# Patient Record
Sex: FEMALE | Race: WHITE | Employment: OTHER | URBAN - METROPOLITAN AREA
[De-identification: names, ages, dates, MRNs, and addresses within clinical notes are randomized per-mention and may not be internally consistent; named-entity substitution may affect disease eponyms.]

---

## 2018-04-12 ENCOUNTER — HOSPITAL ENCOUNTER (EMERGENCY)
Age: 73
Discharge: HOME | End: 2018-04-12
Payer: MEDICARE

## 2018-04-12 ENCOUNTER — APPOINTMENT (OUTPATIENT)
Dept: GENERAL RADIOLOGY | Age: 73
End: 2018-04-12
Payer: MEDICARE

## 2018-04-12 VITALS
BODY MASS INDEX: 30.18 KG/M2 | OXYGEN SATURATION: 96 % | SYSTOLIC BLOOD PRESSURE: 155 MMHG | HEART RATE: 71 BPM | TEMPERATURE: 97.8 F | WEIGHT: 165 LBS | DIASTOLIC BLOOD PRESSURE: 78 MMHG | RESPIRATION RATE: 16 BRPM

## 2018-04-12 DIAGNOSIS — L03.115 CELLULITIS OF RIGHT LOWER EXTREMITY: Primary | ICD-10-CM

## 2018-04-12 PROCEDURE — 99212 OFFICE O/P EST SF 10 MIN: CPT

## 2018-04-12 PROCEDURE — 73590 X-RAY EXAM OF LOWER LEG: CPT

## 2018-04-12 RX ORDER — LISINOPRIL 10 MG/1
10 TABLET ORAL DAILY
COMMUNITY
End: 2022-02-10

## 2018-04-12 RX ORDER — CEPHALEXIN 500 MG/1
500 CAPSULE ORAL 3 TIMES DAILY
Qty: 21 CAPSULE | Refills: 0 | Status: SHIPPED | OUTPATIENT
Start: 2018-04-12 | End: 2018-04-19

## 2018-04-12 RX ORDER — HYDROCHLOROTHIAZIDE 12.5 MG/1
12.5 TABLET ORAL DAILY
COMMUNITY
End: 2022-02-13 | Stop reason: SDUPTHER

## 2018-04-12 ASSESSMENT — PAIN DESCRIPTION - LOCATION: LOCATION: LEG

## 2018-04-12 ASSESSMENT — PAIN DESCRIPTION - ORIENTATION: ORIENTATION: RIGHT;LOWER

## 2018-04-12 ASSESSMENT — PAIN SCALES - GENERAL: PAINLEVEL_OUTOF10: 7

## 2018-10-29 ENCOUNTER — TELEPHONE (OUTPATIENT)
Dept: PULMONOLOGY | Age: 73
End: 2018-10-29

## 2021-03-01 ENCOUNTER — IMMUNIZATION (OUTPATIENT)
Dept: PRIMARY CARE CLINIC | Age: 76
End: 2021-03-01
Payer: MEDICARE

## 2021-03-01 PROCEDURE — 0011A COVID-19, MODERNA VACCINE 100MCG/0.5ML DOSE: CPT | Performed by: NURSE PRACTITIONER

## 2021-03-01 PROCEDURE — 91301 COVID-19, MODERNA VACCINE 100MCG/0.5ML DOSE: CPT | Performed by: NURSE PRACTITIONER

## 2021-03-29 ENCOUNTER — IMMUNIZATION (OUTPATIENT)
Dept: PRIMARY CARE CLINIC | Age: 76
End: 2021-03-29
Payer: MEDICARE

## 2021-03-29 PROCEDURE — 91301 COVID-19, MODERNA VACCINE 100MCG/0.5ML DOSE: CPT | Performed by: NURSE PRACTITIONER

## 2021-03-29 PROCEDURE — 0012A COVID-19, MODERNA VACCINE 100MCG/0.5ML DOSE: CPT | Performed by: NURSE PRACTITIONER

## 2021-07-14 ENCOUNTER — HOSPITAL ENCOUNTER (EMERGENCY)
Age: 76
Discharge: HOME | End: 2021-07-14
Payer: MEDICARE

## 2021-07-14 ENCOUNTER — APPOINTMENT (OUTPATIENT)
Dept: CT IMAGING | Age: 76
End: 2021-07-14
Payer: MEDICARE

## 2021-07-14 VITALS
BODY MASS INDEX: 31.65 KG/M2 | DIASTOLIC BLOOD PRESSURE: 80 MMHG | HEIGHT: 62 IN | OXYGEN SATURATION: 97 % | WEIGHT: 172 LBS | RESPIRATION RATE: 18 BRPM | HEART RATE: 73 BPM | SYSTOLIC BLOOD PRESSURE: 160 MMHG | TEMPERATURE: 98 F

## 2021-07-14 DIAGNOSIS — S00.83XA CONTUSION OF FACE, INITIAL ENCOUNTER: ICD-10-CM

## 2021-07-14 DIAGNOSIS — S00.83XA CONTUSION OF FOREHEAD, INITIAL ENCOUNTER: Primary | ICD-10-CM

## 2021-07-14 DIAGNOSIS — T07.XXXA ABRASIONS OF MULTIPLE SITES: ICD-10-CM

## 2021-07-14 PROCEDURE — 70450 CT HEAD/BRAIN W/O DYE: CPT

## 2021-07-14 PROCEDURE — 70486 CT MAXILLOFACIAL W/O DYE: CPT

## 2021-07-14 PROCEDURE — 99211 OFF/OP EST MAY X REQ PHY/QHP: CPT

## 2021-07-14 PROCEDURE — 72125 CT NECK SPINE W/O DYE: CPT

## 2021-07-14 ASSESSMENT — PAIN DESCRIPTION - ONSET: ONSET: ON-GOING

## 2021-07-14 ASSESSMENT — PAIN DESCRIPTION - PROGRESSION: CLINICAL_PROGRESSION: GRADUALLY WORSENING

## 2021-07-14 ASSESSMENT — PAIN DESCRIPTION - PAIN TYPE: TYPE: ACUTE PAIN

## 2021-07-14 ASSESSMENT — PAIN DESCRIPTION - FREQUENCY: FREQUENCY: CONTINUOUS

## 2021-07-14 ASSESSMENT — PAIN DESCRIPTION - DESCRIPTORS: DESCRIPTORS: SORE;ACHING

## 2021-07-14 ASSESSMENT — PAIN DESCRIPTION - ORIENTATION: ORIENTATION: LEFT;RIGHT

## 2021-07-14 ASSESSMENT — PAIN - FUNCTIONAL ASSESSMENT: PAIN_FUNCTIONAL_ASSESSMENT: ACTIVITIES ARE NOT PREVENTED

## 2021-07-14 NOTE — ED PROVIDER NOTES
[id number]  Department of Emergency Medicine  UC Encounter Note  21   4:25 PM EDT      NAME: Mikey Crigler  :  1945  MRN:  [id number]    Chief Complaint: Fall (Pt states \"I fell at the [facility] about 2 hrs ago . I went to put something in the Trash & fell over a barrier\" Pt's  drove her to the Urgent Care today)      This is a 79-year-old female the presents to urgent care stating that she fell today at a car wash. Patient states it happened so fast that she does not know exactly what happened but she was trying to put some trash away and fell over some type of barrier she states she hit her head also she has abrasions to her arm and leg as well. She states her glasses were knocked off in the lenses were scraped. She did state there was some nausea as well. She complains of pain still to her forehead and nose and she states the discomfort to the extremities are mostly because of the abrasions. She denies any chest pain or shortness of breath. No abdominal pain or back pain. No neck pain at this time. On first contact patient she appears to be in no acute distress she is here with her . Review of Systems  Pertinent positives and negatives are stated within HPI, all other systems reviewed and are negative. Physical Exam  Vitals and nursing note reviewed. Constitutional:       Appearance: She is well-developed. HENT:      Head: Normocephalic. Abrasion present. No raccoon eyes, Fuentes's sign, right periorbital erythema or left periorbital erythema. Jaw: There is normal jaw occlusion. Comments: Patient has an abrasion across the bridge of the nose also she has bruising that is light to her left forehead. There is tenderness to the left forehead. But no bony deformity. Right Ear: Hearing, tympanic membrane, ear canal and external ear normal. No mastoid tenderness. No hemotympanum.       Left Ear: Hearing, tympanic membrane, ear sensory deficit. Motor: Motor function is intact. Coordination: Coordination is intact. Coordination normal.      Gait: Gait is intact. Gait normal.         Procedures    MDM  Number of Diagnoses or Management Options  Abrasions of multiple sites  Contusion of face, initial encounter  Contusion of forehead, initial encounter  Diagnosis management comments: Patient worried about her head injury. She is in no acute distress though. We will do some CAT scans she will go to the hospital for the outpatient CAT scans her  will take her. Scans were reviewed. I spoke to her by phone. She is doing well. No new complaints. I will fax her over the discharge instructions. Told her to go to ER if symptoms worsen. --------------------------------------------- PAST HISTORY ---------------------------------------------  Past Medical History:  has a past medical history of Anxiety, Arthritis, and Hypertension. Past Surgical History:  has a past surgical history that includes Hysterectomy; Breast surgery; Tonsillectomy; and joint replacement. Social History:  reports that she has never smoked. She has never used smokeless tobacco. She reports that she does not drink alcohol and does not use drugs. Family History: family history is not on file. The patients home medications have been reviewed. Allergies: Codeine, Procaine hcl, Sulfa antibiotics, and Tetanus toxoids    -------------------------------------------------- RESULTS -------------------------------------------------  No results found for this visit on 07/14/21. CT FACIAL BONES WO CONTRAST    (Results Pending)   CT HEAD WO CONTRAST    (Results Pending)   CT CERVICAL SPINE WO CONTRAST    (Results Pending)       ------------------------- NURSING NOTES AND VITALS REVIEWED ---------------------------   The nursing notes within the ED encounter and vital signs as below have been reviewed.    BP (!) 160/80   Pulse 73   Temp 98 °F (36.7 °C) (Temporal)   Resp 18   Ht 5' 2\" (1.575 m)   Wt 172 lb (78 kg)   SpO2 97%   BMI 31.46 kg/m²   Oxygen Saturation Interpretation: Normal      ------------------------------------------ PROGRESS NOTES ------------------------------------------   I have spoken with the patient and discussed todays results, in addition to providing specific details for the plan of care and counseling regarding the diagnosis and prognosis. Their questions are answered at this time and they are agreeable with the plan.      --------------------------------- ADDITIONAL PROVIDER NOTES ---------------------------------     This patient is stable for discharge. I have shared the specific conditions for return, as well as the importance of follow-up. * NOTE: This report was transcribed using voice recognition software. Every effort was made to ensure accuracy; however, inadvertent computerized transcription errors may be present.    --------------------------------- IMPRESSION AND DISPOSITION ---------------------------------    IMPRESSION  1. Contusion of forehead, initial encounter    2. Contusion of face, initial encounter    3.  Abrasions of multiple sites        DISPOSITION  Disposition: Discharge to home  Patient condition is good           Suyapa España PA-C  07/14/21 [id number]

## 2021-10-29 ENCOUNTER — IMMUNIZATION (OUTPATIENT)
Dept: PRIMARY CARE CLINIC | Age: 76
End: 2021-10-29
Payer: MEDICARE

## 2021-10-29 PROCEDURE — 0064A COVID-19, MODERNA BOOSTER VACCINE 0.25ML DOSE: CPT | Performed by: NURSE PRACTITIONER

## 2021-10-29 PROCEDURE — 91306 COVID-19, MODERNA BOOSTER VACCINE 0.25ML DOSE: CPT | Performed by: NURSE PRACTITIONER

## 2022-02-10 ENCOUNTER — OFFICE VISIT (OUTPATIENT)
Dept: FAMILY MEDICINE CLINIC | Age: 77
End: 2022-02-10
Payer: MEDICARE

## 2022-02-10 VITALS
HEIGHT: 62 IN | DIASTOLIC BLOOD PRESSURE: 78 MMHG | WEIGHT: 176 LBS | SYSTOLIC BLOOD PRESSURE: 124 MMHG | OXYGEN SATURATION: 98 % | HEART RATE: 84 BPM | RESPIRATION RATE: 18 BRPM | BODY MASS INDEX: 32.39 KG/M2

## 2022-02-10 DIAGNOSIS — R00.0 TACHYCARDIA: ICD-10-CM

## 2022-02-10 DIAGNOSIS — L65.9 HAIR LOSS: ICD-10-CM

## 2022-02-10 DIAGNOSIS — F41.0 PANIC ANXIETY SYNDROME: ICD-10-CM

## 2022-02-10 DIAGNOSIS — J34.89 NOSE IRRITATION: ICD-10-CM

## 2022-02-10 DIAGNOSIS — I10 PRIMARY HYPERTENSION: Primary | ICD-10-CM

## 2022-02-10 DIAGNOSIS — R73.01 IFG (IMPAIRED FASTING GLUCOSE): ICD-10-CM

## 2022-02-10 DIAGNOSIS — E55.9 VITAMIN D DEFICIENCY: ICD-10-CM

## 2022-02-10 DIAGNOSIS — R53.82 CHRONIC FATIGUE: ICD-10-CM

## 2022-02-10 DIAGNOSIS — R00.2 PALPITATIONS: ICD-10-CM

## 2022-02-10 DIAGNOSIS — E78.2 MIXED HYPERLIPIDEMIA: ICD-10-CM

## 2022-02-10 DIAGNOSIS — M25.541 ARTHRALGIA OF BOTH HANDS: ICD-10-CM

## 2022-02-10 DIAGNOSIS — M25.542 ARTHRALGIA OF BOTH HANDS: ICD-10-CM

## 2022-02-10 DIAGNOSIS — U09.9 POST COVID-19 CONDITION, UNSPECIFIED: ICD-10-CM

## 2022-02-10 PROCEDURE — 99203 OFFICE O/P NEW LOW 30 MIN: CPT | Performed by: FAMILY MEDICINE

## 2022-02-10 RX ORDER — KETOCONAZOLE 20 MG/G
CREAM TOPICAL
COMMUNITY

## 2022-02-10 RX ORDER — SENNOSIDES 8.6 MG
CAPSULE ORAL
COMMUNITY

## 2022-02-10 RX ORDER — LOSARTAN POTASSIUM 50 MG/1
TABLET ORAL
COMMUNITY
Start: 2021-11-17 | End: 2022-02-13 | Stop reason: SDUPTHER

## 2022-02-10 RX ORDER — MUPIROCIN CALCIUM 20 MG/G
CREAM TOPICAL
Qty: 30 G | Refills: 5 | Status: SHIPPED | OUTPATIENT
Start: 2022-02-10

## 2022-02-10 SDOH — ECONOMIC STABILITY: FOOD INSECURITY: WITHIN THE PAST 12 MONTHS, THE FOOD YOU BOUGHT JUST DIDN'T LAST AND YOU DIDN'T HAVE MONEY TO GET MORE.: NEVER TRUE

## 2022-02-10 SDOH — ECONOMIC STABILITY: FOOD INSECURITY: WITHIN THE PAST 12 MONTHS, YOU WORRIED THAT YOUR FOOD WOULD RUN OUT BEFORE YOU GOT MONEY TO BUY MORE.: NEVER TRUE

## 2022-02-10 ASSESSMENT — PATIENT HEALTH QUESTIONNAIRE - PHQ9
SUM OF ALL RESPONSES TO PHQ QUESTIONS 1-9: 0
SUM OF ALL RESPONSES TO PHQ QUESTIONS 1-9: 0
1. LITTLE INTEREST OR PLEASURE IN DOING THINGS: 0
2. FEELING DOWN, DEPRESSED OR HOPELESS: 0
SUM OF ALL RESPONSES TO PHQ QUESTIONS 1-9: 0
SUM OF ALL RESPONSES TO PHQ QUESTIONS 1-9: 0
SUM OF ALL RESPONSES TO PHQ9 QUESTIONS 1 & 2: 0

## 2022-02-10 ASSESSMENT — SOCIAL DETERMINANTS OF HEALTH (SDOH): HOW HARD IS IT FOR YOU TO PAY FOR THE VERY BASICS LIKE FOOD, HOUSING, MEDICAL CARE, AND HEATING?: NOT VERY HARD

## 2022-02-10 ASSESSMENT — LIFESTYLE VARIABLES: HOW OFTEN DO YOU HAVE A DRINK CONTAINING ALCOHOL: NEVER

## 2022-02-10 NOTE — PROGRESS NOTES
Lenin Kraft is a 67 y.o. female  . Subjective:      Has been having some nasal bleeding in morning. Very dry in house. Discussed nasal staph and humidification. Losing some hair since since having covid. Discussed biotin and minoxidil . We will perfrm blood work to see if thyroid is involved. Having episodes of heart racing at night. Complains of arthralgias to hands and knees. Had total ankle years ago. Had cologuard, this was negative per patient. We will need to obtain this for record. Had mammogram, we will obtain this as well. Taking lorazepam this has helped. Patient understands habit forming nature of medication and assumes all risks. Patient will not drink alcohol , work or drive while on medication. Review of Systems   Constitutional: Positive for fatigue. Negative for activity change, appetite change, chills, diaphoresis, fever and unexpected weight change. HENT: Positive for nosebleeds. Negative for congestion, dental problem, drooling, ear discharge, ear pain, facial swelling, hearing loss, mouth sores, postnasal drip, rhinorrhea, sinus pressure, sneezing, sore throat, tinnitus, trouble swallowing and voice change. Eyes: Negative for photophobia, pain, discharge, redness, itching and visual disturbance. Respiratory: Negative for apnea, cough, choking, chest tightness, shortness of breath, wheezing and stridor. Cardiovascular: Positive for palpitations. Negative for chest pain and leg swelling. Gastrointestinal: Negative for abdominal distention, abdominal pain, anal bleeding, blood in stool, constipation, diarrhea, nausea, rectal pain and vomiting. Endocrine: Negative for cold intolerance, heat intolerance, polydipsia, polyphagia and polyuria.    Genitourinary: Negative for decreased urine volume, difficulty urinating, dyspareunia, dysuria, enuresis, flank pain, frequency, genital sores, hematuria, menstrual problem, pelvic pain, urgency, vaginal bleeding, vaginal discharge and vaginal pain.   Musculoskeletal: Negative for arthralgias, back pain, gait problem, joint swelling, myalgias, neck pain and neck stiffness. Skin: Negative for color change, pallor, rash and wound. Allergic/Immunologic: Negative for environmental allergies, food allergies and immunocompromised state. Neurological: Negative for dizziness, tremors, seizures, syncope, facial asymmetry, speech difficulty, weakness, light-headedness, numbness and headaches. Hematological: Negative for adenopathy. Does not bruise/bleed easily. Psychiatric/Behavioral: Negative for agitation, behavioral problems, confusion, decreased concentration, dysphoric mood, hallucinations, self-injury, sleep disturbance and suicidal ideas. The patient is not nervous/anxious and is not hyperactive. Past Medical History:   Diagnosis Date    Anxiety     Arthritis     Hypertension        Social History     Socioeconomic History    Marital status:      Spouse name: Not on file    Number of children: Not on file    Years of education: Not on file    Highest education level: Not on file   Occupational History    Not on file   Tobacco Use    Smoking status: Never Smoker    Smokeless tobacco: Never Used   Vaping Use    Vaping Use: Never used   Substance and Sexual Activity    Alcohol use: No    Drug use: No    Sexual activity: Not Currently     Partners: Male   Other Topics Concern    Not on file   Social History Narrative    Not on file     Social Determinants of Health     Financial Resource Strain: Low Risk     Difficulty of Paying Living Expenses: Not very hard   Food Insecurity: No Food Insecurity    Worried About Running Out of Food in the Last Year: Never true    [address] in the Last Year: Never true   Transportation Needs:     Lack of Transportation (Medical): Not on file    Lack of Transportation (Non-Medical):  Not on file   Physical Activity:     Days of Exercise per Week: Not on file    Minutes of Exercise per Session: Not on file   Stress:     Feeling of Stress : Not on file   Social Connections:     Frequency of Communication with Friends and Family: Not on file    Frequency of Social Gatherings with Friends and Family: Not on file    Attends Alevism Services: Not on file    Active Member of Clubs or Organizations: Not on file    Attends Club or Organization Meetings: Not on file    Marital Status: Not on file   Intimate Partner Violence:     Fear of Current or Ex-Partner: Not on file    Emotionally Abused: Not on file    Physically Abused: Not on file    Sexually Abused: Not on file   Housing Stability:     Unable to Pay for Housing in the Last Year: Not on file    Number of [place] in the Last Year: Not on file    Unstable Housing in the Last Year: Not on file       No family history on file. Current Outpatient Medications on File Prior to Visit   Medication Sig Dispense Refill    ketoconazole (NIZORAL) 2 % cream ketoconazole 2 % topical cream   APPLY TO THE AFFECTED AREA(S) BY TOPICAL ROUTE ONCE DAILY      losartan (COZAAR) 50 MG tablet TAKE 1 TABLET BY MOUTH EVERY DAY      hydroCHLOROthiazide (HYDRODIURIL) 12.5 MG tablet Take 12.5 mg by mouth daily       Garlic 748 MG TABS Take by mouth      RED YEAST RICE by Does not apply route.  therapeutic multivitamin-minerals (THERAGRAN-M) tablet Take 1 tablet by mouth daily.  Coenzyme Q10 (CO Q 10) 10 MG CAPS Take  by mouth.  Vitamin D (CHOLECALCIFEROL) 1000 UNITS CAPS capsule Take 1,000 Units by mouth daily.  Probiotic Product (PROBIOTIC FORMULA PO) Take  by mouth.  LORazepam (ATIVAN) 0.5 MG tablet Take 0.5 mg by mouth every 8 hours as needed.  metoprolol succinate (TOPROL XL) 50 MG extended release tablet Take 50 mg by mouth daily       loratadine (CLARITIN) 10 MG tablet Take 10 mg by mouth daily.  Naproxen Sodium (ALEVE) 220 MG CAPS Take  by mouth.       acetaminophen (TYLENOL 8 HOUR) 650 MG extended release tablet Tylenol Arthritis Pain 650 mg tablet,extended release   Take 1 tablet every 8 hours by oral route. No current facility-administered medications on file prior to visit. Allergies   Allergen Reactions    Clarithromycin Nausea Only    Codeine     Procaine Hcl     Sulfa Antibiotics     Tetanus Toxoids     Tetanus-Diphtheria Toxoids Td      Other reaction(s): Edema       I have reviewedher allergies, medications, problem list, medical, social and family history and have updated as needed in the electronic medical record. Objective:     Physical Exam  Vitals and nursing note reviewed. Constitutional:       General: She is not in acute distress. Appearance: She is well-developed. She is not diaphoretic. HENT:      Head: Normocephalic and atraumatic. Right Ear: External ear normal.      Left Ear: External ear normal.      Nose: Nose normal.      Comments: Nasal mucosa irritation     Mouth/Throat:      Pharynx: No oropharyngeal exudate. Eyes:      General: No scleral icterus. Right eye: No discharge. Left eye: No discharge. Conjunctiva/sclera: Conjunctivae normal.      Pupils: Pupils are equal, round, and reactive to light. Neck:      Thyroid: No thyromegaly. Vascular: No JVD. Trachea: No tracheal deviation. Cardiovascular:      Rate and Rhythm: Normal rate and regular rhythm. Heart sounds: Normal heart sounds. No murmur heard. No friction rub. No gallop. Pulmonary:      Effort: Pulmonary effort is normal. No respiratory distress. Breath sounds: Normal breath sounds. No stridor. No wheezing or rales. Chest:      Chest wall: No tenderness. Abdominal:      General: Bowel sounds are normal. There is no distension. Palpations: Abdomen is soft. There is no mass. Tenderness: There is no abdominal tenderness. There is no guarding or rebound. Genitourinary:     Comments:  Will follow with own gynecologist for gynecological and breast care. Musculoskeletal:         General: No tenderness. Normal range of motion. Cervical back: Normal range of motion and neck supple. Lymphadenopathy:      Cervical: No cervical adenopathy. Skin:     General: Skin is warm and dry. Coloration: Skin is not pale. Findings: No erythema or rash. Neurological:      Mental Status: She is alert and oriented to person, place, and time. Cranial Nerves: No cranial nerve deficit. Motor: No abnormal muscle tone. Coordination: Coordination normal.      Deep Tendon Reflexes: Reflexes are normal and symmetric. Reflexes normal.   Psychiatric:         Behavior: Behavior normal.         Thought Content: Thought content normal.         Judgment: Judgment normal.         Assessment / Plan:   Avel was seen today for established new doctor. Diagnoses and all orders for this visit:    Primary hypertension  -     HEPATITIS C ANTIBODY; Future  -     losartan (COZAAR) 50 MG tablet; TAKE 1 TABLET BY MOUTH EVERY DAY  -     hydroCHLOROthiazide (HYDRODIURIL) 12.5 MG tablet; Take 1 tablet by mouth daily  -     metoprolol succinate (TOPROL XL) 50 MG extended release tablet; Take 1 tablet by mouth daily    IFG (impaired fasting glucose)  -     Hemoglobin A1C; Future  -     HEPATITIS C ANTIBODY; Future    Tachycardia  -     HEPATITIS C ANTIBODY; Future  -     [facility]    Mixed hyperlipidemia  -     Lipid Panel; Future  -     HEPATITIS C ANTIBODY; Future    Vitamin D deficiency  -     Vitamin D 25 Hydroxy; Future  -     HEPATITIS C ANTIBODY; Future    Chronic fatigue  -     CBC Auto Differential; Future  -     Comprehensive Metabolic Panel; Future  -     TSH without Reflex; Future  -     T4, Free; Future  -     Vitamin B12 & Folate; Future  -     HEPATITIS C ANTIBODY; Future    Arthralgia of both hands  -     Rheumatoid Factor; Future  -     HEPATITIS C ANTIBODY;  Future    Nose irritation  -     mupirocin (BACTROBAN) 2 % cream; Apply at bedtime to bilateral nostrils as directed. -     HEPATITIS C ANTIBODY; Future    Post covid-19 condition, unspecified  -     HEPATITIS C ANTIBODY; Future    Hair loss  -     TSH without Reflex; Future  -     T4, Free; Future  -     HEPATITIS C ANTIBODY; Future    Panic anxiety syndrome  -     LORazepam (ATIVAN) 0.5 MG tablet; Take 1 tablet by mouth every 8 hours as needed for Anxiety. Palpitations  -     [address] with own gynecologist for gynecological and breast care. Reviewed health maintenance report. Patient is aware of deficiencies and suggested preventative tests.

## 2022-02-11 DIAGNOSIS — R00.0 TACHYCARDIA: ICD-10-CM

## 2022-02-11 DIAGNOSIS — E55.9 VITAMIN D DEFICIENCY: ICD-10-CM

## 2022-02-11 DIAGNOSIS — M25.542 ARTHRALGIA OF BOTH HANDS: ICD-10-CM

## 2022-02-11 DIAGNOSIS — J34.89 NOSE IRRITATION: ICD-10-CM

## 2022-02-11 DIAGNOSIS — R73.01 IFG (IMPAIRED FASTING GLUCOSE): ICD-10-CM

## 2022-02-11 DIAGNOSIS — L65.9 HAIR LOSS: ICD-10-CM

## 2022-02-11 DIAGNOSIS — E78.2 MIXED HYPERLIPIDEMIA: ICD-10-CM

## 2022-02-11 DIAGNOSIS — I10 PRIMARY HYPERTENSION: ICD-10-CM

## 2022-02-11 DIAGNOSIS — M25.541 ARTHRALGIA OF BOTH HANDS: ICD-10-CM

## 2022-02-11 DIAGNOSIS — R53.82 CHRONIC FATIGUE: ICD-10-CM

## 2022-02-11 DIAGNOSIS — U09.9 POST COVID-19 CONDITION, UNSPECIFIED: ICD-10-CM

## 2022-02-11 LAB
ALBUMIN SERPL-MCNC: 4.5 G/DL (ref 3.5–5.2)
ALP BLD-CCNC: 65 U/L (ref 35–104)
ALT SERPL-CCNC: 20 U/L (ref 0–32)
ANION GAP SERPL CALCULATED.3IONS-SCNC: 16 MMOL/L (ref 7–16)
AST SERPL-CCNC: 27 U/L (ref 0–31)
BASOPHILS ABSOLUTE: 0.03 E9/L (ref 0–0.2)
BASOPHILS RELATIVE PERCENT: 0.5 % (ref 0–2)
BILIRUB SERPL-MCNC: 1.3 MG/DL (ref 0–1.2)
BUN BLDV-MCNC: 14 MG/DL (ref 6–23)
CALCIUM SERPL-MCNC: 9.6 MG/DL (ref 8.6–10.2)
CHLORIDE BLD-SCNC: 97 MMOL/L (ref 98–107)
CHOLESTEROL, TOTAL: 203 MG/DL (ref 0–199)
CO2: 21 MMOL/L (ref 22–29)
CREAT SERPL-MCNC: 0.7 MG/DL (ref 0.5–1)
EOSINOPHILS ABSOLUTE: 0.09 E9/L (ref 0.05–0.5)
EOSINOPHILS RELATIVE PERCENT: 1.4 % (ref 0–6)
FOLATE: >20 NG/ML (ref 4.8–24.2)
GFR AFRICAN AMERICAN: >60
GFR NON-AFRICAN AMERICAN: >60 ML/MIN/1.73
GLUCOSE BLD-MCNC: 111 MG/DL (ref 74–99)
HBA1C MFR BLD: 5.9 % (ref 4–5.6)
HCT VFR BLD CALC: 41.2 % (ref 34–48)
HDLC SERPL-MCNC: 52 MG/DL
HEMOGLOBIN: 14 G/DL (ref 11.5–15.5)
IMMATURE GRANULOCYTES #: 0.03 E9/L
IMMATURE GRANULOCYTES %: 0.5 % (ref 0–5)
LDL CHOLESTEROL CALCULATED: 125 MG/DL (ref 0–99)
LYMPHOCYTES ABSOLUTE: 1.66 E9/L (ref 1.5–4)
LYMPHOCYTES RELATIVE PERCENT: 25.9 % (ref 20–42)
MCH RBC QN AUTO: 31.7 PG (ref 26–35)
MCHC RBC AUTO-ENTMCNC: 34 % (ref 32–34.5)
MCV RBC AUTO: 93.2 FL (ref 80–99.9)
MONOCYTES ABSOLUTE: 0.74 E9/L (ref 0.1–0.95)
MONOCYTES RELATIVE PERCENT: 11.6 % (ref 2–12)
NEUTROPHILS ABSOLUTE: 3.85 E9/L (ref 1.8–7.3)
NEUTROPHILS RELATIVE PERCENT: 60.1 % (ref 43–80)
PDW BLD-RTO: 14.2 FL (ref 11.5–15)
PLATELET # BLD: 287 E9/L (ref 130–450)
PMV BLD AUTO: 9.3 FL (ref 7–12)
POTASSIUM SERPL-SCNC: 4.3 MMOL/L (ref 3.5–5)
RBC # BLD: 4.42 E12/L (ref 3.5–5.5)
RHEUMATOID FACTOR: <10 IU/ML (ref 0–13)
SODIUM BLD-SCNC: 134 MMOL/L (ref 132–146)
T4 FREE: 1.34 NG/DL (ref 0.93–1.7)
TOTAL PROTEIN: 7.7 G/DL (ref 6.4–8.3)
TRIGL SERPL-MCNC: 131 MG/DL (ref 0–149)
TSH SERPL DL<=0.05 MIU/L-ACNC: 2.64 UIU/ML (ref 0.27–4.2)
VITAMIN B-12: 895 PG/ML (ref 211–946)
VITAMIN D 25-HYDROXY: 30 NG/ML (ref 30–100)
VLDLC SERPL CALC-MCNC: 26 MG/DL
WBC # BLD: 6.4 E9/L (ref 4.5–11.5)

## 2022-02-13 RX ORDER — HYDROCHLOROTHIAZIDE 12.5 MG/1
12.5 TABLET ORAL DAILY
Qty: 90 TABLET | Refills: 5 | Status: SHIPPED | OUTPATIENT
Start: 2022-02-13

## 2022-02-13 RX ORDER — LORAZEPAM 0.5 MG/1
0.5 TABLET ORAL EVERY 8 HOURS PRN
Qty: 90 TABLET | Refills: 5 | Status: SHIPPED
Start: 2022-02-13 | End: 2022-04-13 | Stop reason: SDUPTHER

## 2022-02-13 RX ORDER — LOSARTAN POTASSIUM 50 MG/1
TABLET ORAL
Qty: 90 TABLET | Refills: 5 | Status: SHIPPED | OUTPATIENT
Start: 2022-02-13

## 2022-02-13 RX ORDER — METOPROLOL SUCCINATE 50 MG/1
50 TABLET, EXTENDED RELEASE ORAL DAILY
Qty: 90 TABLET | Refills: 5 | Status: SHIPPED | OUTPATIENT
Start: 2022-02-13

## 2022-02-13 ASSESSMENT — ENCOUNTER SYMPTOMS
BACK PAIN: 0
CHEST TIGHTNESS: 0
ABDOMINAL PAIN: 0
WHEEZING: 0
PHOTOPHOBIA: 0
NAUSEA: 0
RECTAL PAIN: 0
CHOKING: 0
STRIDOR: 0
RHINORRHEA: 0
EYE REDNESS: 0
FACIAL SWELLING: 0
ABDOMINAL DISTENTION: 0
TROUBLE SWALLOWING: 0
SORE THROAT: 0
SINUS PRESSURE: 0
SHORTNESS OF BREATH: 0
ANAL BLEEDING: 0
VOMITING: 0
DIARRHEA: 0
COUGH: 0
EYE ITCHING: 0
EYE PAIN: 0
EYE DISCHARGE: 0
APNEA: 0
VOICE CHANGE: 0
COLOR CHANGE: 0
CONSTIPATION: 0
BLOOD IN STOOL: 0

## 2022-02-16 LAB — HEPATITIS C ANTIBODY INTERPRETATION: NORMAL

## 2022-02-18 PROBLEM — R00.2 PALPITATIONS: Status: ACTIVE | Noted: 2022-02-18

## 2022-02-18 PROBLEM — E78.00 PURE HYPERCHOLESTEROLEMIA: Status: ACTIVE | Noted: 2022-02-18

## 2022-02-18 PROBLEM — E66.9 MODERATE OBESITY: Status: ACTIVE | Noted: 2022-02-18

## 2022-02-18 PROBLEM — E66.8 MODERATE OBESITY: Status: ACTIVE | Noted: 2022-02-18

## 2022-02-18 PROBLEM — I10 PRIMARY HYPERTENSION: Status: ACTIVE | Noted: 2022-02-18

## 2022-02-25 RX ORDER — AMOXICILLIN 875 MG/1
875 TABLET, COATED ORAL 2 TIMES DAILY
Qty: 20 TABLET | Refills: 0 | Status: SHIPPED | OUTPATIENT
Start: 2022-02-25 | End: 2022-03-07

## 2022-03-09 ENCOUNTER — OFFICE VISIT (OUTPATIENT)
Dept: CARDIOLOGY CLINIC | Age: 77
End: 2022-03-09
Payer: MEDICARE

## 2022-03-09 VITALS
SYSTOLIC BLOOD PRESSURE: 140 MMHG | BODY MASS INDEX: 32.2 KG/M2 | HEART RATE: 116 BPM | HEIGHT: 62 IN | DIASTOLIC BLOOD PRESSURE: 84 MMHG | WEIGHT: 175 LBS | RESPIRATION RATE: 16 BRPM

## 2022-03-09 DIAGNOSIS — E66.8 MODERATE OBESITY: ICD-10-CM

## 2022-03-09 DIAGNOSIS — I10 PRIMARY HYPERTENSION: ICD-10-CM

## 2022-03-09 DIAGNOSIS — E78.00 PURE HYPERCHOLESTEROLEMIA: ICD-10-CM

## 2022-03-09 DIAGNOSIS — I48.0 PAROXYSMAL ATRIAL FIBRILLATION (HCC): Primary | ICD-10-CM

## 2022-03-09 PROBLEM — U07.1 COVID-19 VIRUS INFECTION: Status: ACTIVE | Noted: 2022-03-09

## 2022-03-09 PROCEDURE — 99204 OFFICE O/P NEW MOD 45 MIN: CPT | Performed by: INTERNAL MEDICINE

## 2022-03-09 PROCEDURE — 93000 ELECTROCARDIOGRAM COMPLETE: CPT | Performed by: INTERNAL MEDICINE

## 2022-03-09 RX ORDER — MULTIVIT WITH MINERALS/LUTEIN
1000 TABLET ORAL DAILY
COMMUNITY
End: 2022-03-09 | Stop reason: ALTCHOICE

## 2022-03-09 RX ORDER — ASCORBIC ACID 500 MG
500 TABLET ORAL DAILY
COMMUNITY

## 2022-03-09 NOTE — PROGRESS NOTES
OUTPATIENT CARDIOLOGY CONSULT    Name: Perez Peacock    Age: 65 y.o. Date of Service: 3/9/2022    Reason for Consultation: Paroxysmal atrial fibrillation, hypertension, moderate obesity    Referring Physician: Fidel Acosta DO    History of Present Illness: The patient is a a70-year-old white female with no previous documented structural heart disease and a risk profile of hypertension, hyperlipidemia and moderate obesity. She relates a chronic exertional dyspnea (functional class II) with no additional symptoms of decompensated left ventricular systolic dysfunction or volume overload as well as denying arrhythmia related symptoms. Over the past 2 months she is noted an increase of heart rates without by her report any change of her chronic dyspnea or changes of her functional capabilities with no additional cardiovascular symptoms and was noted to be tachycardic at time of most recent assessment by her primary care physician without electrocardiogram obtained. At the time of evaluation beyond that of palpitation she denies any additional arrhythmia related symptoms and has noted no symptoms of a focal neurologic origin nor that of significant bleeding. Review of Systems: The remainder of a complete multisystem review including consitutional, central nervous, respiratory, circulatory, gastrointestinal, genitourinary, endocrinologic, hematologic, musculoskeletal and psychiatric are negative. Past Medical History:  Past Medical History:   Diagnosis Date    Anxiety     Arthritis     Hypertension     Pure hypercholesterolemia 2/18/2022       Past Surgical History:  Past Surgical History:   Procedure Laterality Date    BREAST SURGERY      HYSTERECTOMY      JOINT REPLACEMENT      right ankle    TONSILLECTOMY         Family History:  History reviewed. No pertinent family history.     Social History:  Social History     Socioeconomic History    Marital status:      Spouse name: Not on file  Number of children: Not on file    Years of education: Not on file    Highest education level: Not on file   Occupational History    Not on file   Tobacco Use    Smoking status: Never Smoker    Smokeless tobacco: Never Used   Vaping Use    Vaping Use: Never used   Substance and Sexual Activity    Alcohol use: No    Drug use: No    Sexual activity: Not Currently     Partners: Male   Other Topics Concern    Not on file   Social History Narrative    Not on file     Social Determinants of Health     Financial Resource Strain: Low Risk     Difficulty of Paying Living Expenses: Not very hard   Food Insecurity: No Food Insecurity    Worried About Running Out of Food in the Last Year: Never true    Mikel of Food in the Last Year: Never true   Transportation Needs:     Lack of Transportation (Medical): Not on file    Lack of Transportation (Non-Medical): Not on file   Physical Activity:     Days of Exercise per Week: Not on file    Minutes of Exercise per Session: Not on file   Stress:     Feeling of Stress : Not on file   Social Connections:     Frequency of Communication with Friends and Family: Not on file    Frequency of Social Gatherings with Friends and Family: Not on file    Attends Hinduism Services: Not on file    Active Member of [address] [place] [address] or Organizations: Not on file    Attends Club or Organization Meetings: Not on file    Marital Status: Not on file   Intimate Partner Violence:     Fear of Current or Ex-Partner: Not on file    Emotionally Abused: Not on file    Physically Abused: Not on file    Sexually Abused: Not on file   Housing Stability:     Unable to Pay for Housing in the Last Year: Not on file    Number of [place] in the Last Year: Not on file    Unstable Housing in the Last Year: Not on file       Allergies:   Allergies   Allergen Reactions    Clarithromycin Nausea Only    Codeine     Procaine Hcl     Sulfa Antibiotics     Tetanus Toxoids     Tetanus-Diphtheria Toxoids Td      Other reaction(s): Edema       Current Medications:  Current Outpatient Medications   Medication Sig Dispense Refill    vitamin C (ASCORBIC ACID) 500 MG tablet Take 500 mg by mouth daily      vitamin E 1000 units capsule Take 1,000 Units by mouth daily      losartan (COZAAR) 50 MG tablet TAKE 1 TABLET BY MOUTH EVERY DAY 90 tablet 5    hydroCHLOROthiazide (HYDRODIURIL) 12.5 MG tablet Take 1 tablet by mouth daily 90 tablet 5    LORazepam (ATIVAN) 0.5 MG tablet Take 1 tablet by mouth every 8 hours as needed for Anxiety. 90 tablet 5    metoprolol succinate (TOPROL XL) 50 MG extended release tablet Take 1 tablet by mouth daily 90 tablet 5    acetaminophen (TYLENOL 8 HOUR) 650 MG extended release tablet Tylenol Arthritis Pain 650 mg tablet,extended release   Take 1 tablet every 8 hours by oral route.  Garlic 925 MG TABS Take by mouth      RED YEAST RICE by Does not apply route.  therapeutic multivitamin-minerals (THERAGRAN-M) tablet Take 1 tablet by mouth daily.  Coenzyme Q10 (CO Q 10) 10 MG CAPS Take  by mouth.  Vitamin D (CHOLECALCIFEROL) 1000 UNITS CAPS capsule Take 1,000 Units by mouth daily.  Probiotic Product (PROBIOTIC FORMULA PO) Take  by mouth.  loratadine (CLARITIN) 10 MG tablet Take 10 mg by mouth daily.  ketoconazole (NIZORAL) 2 % cream ketoconazole 2 % topical cream   APPLY TO THE AFFECTED AREA(S) BY TOPICAL ROUTE ONCE DAILY (Patient not taking: Reported on 3/9/2022)      mupirocin (BACTROBAN) 2 % cream Apply at bedtime to bilateral nostrils as directed. (Patient not taking: Reported on 3/9/2022) 30 g 5    Naproxen Sodium (ALEVE) 220 MG CAPS Take  by mouth. (Patient not taking: Reported on 3/9/2022)       No current facility-administered medications for this visit.          Physical Exam:  BP (!) 140/84   Pulse 116   Resp 16   Ht 5' 2\" (1.575 m)   Wt 175 lb (79.4 kg)   BMI 32.01 kg/m²   Wt Readings from Last 3 Encounters: 03/09/22 175 lb (79.4 kg)   02/10/22 176 lb (79.8 kg)   07/14/21 172 lb (78 kg)     The patient is awake, alert and in no discomfort or distress. No gross musculoskeletal deformity or lymphadenopathy are present. No significant skin or nail changes are present. Gross examination of head, eyes, nose and throat are negative. Jugular venous pressure is normal and no carotid bruits are present. No thyromegaly is noted. Normal respiratory effort is noted with no accessory muscle usage present. Lung fields are clear to ascultation. Cardiac examination is notable for an irregular rhythm with no palpable thrill. No gallop rhythm or cardiac murmur are identified. A benign abdominal examination is present with the exception of mild obesity and no masses or organomegaly. A normal abdominal aortic pulsation is present. Intact pulses are present throughout all extremities and no peripheral edema is present. No focal neurologic deficits are present. Laboratory Tests:  Lab Results   Component Value Date    CREATININE 0.7 02/11/2022    BUN 14 02/11/2022     02/11/2022    K 4.3 02/11/2022    CL 97 (L) 02/11/2022    CO2 21 (L) 02/11/2022     No results found for: BNP  Lab Results   Component Value Date    WBC 6.4 02/11/2022    RBC 4.42 02/11/2022    HGB 14.0 02/11/2022    HCT 41.2 02/11/2022    MCV 93.2 02/11/2022    MCH 31.7 02/11/2022    MCHC 34.0 02/11/2022    RDW 14.2 02/11/2022     02/11/2022    MPV 9.3 02/11/2022     No results for input(s): ALKPHOS, ALT, AST, PROT, BILITOT, BILIDIR, LABALBU in the last 72 hours.   No results found for: MG  No results found for: PROTIME, INR  Lab Results   Component Value Date    TSH 2.640 02/11/2022     No components found for: CHLPL  Lab Results   Component Value Date    TRIG 131 02/11/2022    TRIG 123 11/08/2011    TRIG 148 08/08/2011     Lab Results   Component Value Date    HDL 52 02/11/2022    HDL 62.0 11/08/2011    HDL 56.0 08/08/2011     Lab Results   Component Value Date    LDLCALC 125 (H) 02/11/2022    LDLCALC 160 (H) 11/08/2011    LDLCALC 114 (H) 08/08/2011       Cardiac Tests:  ECG: A resting electrocardiogram demonstrates evidence of atrial fibrillation with a mean ventricular response of approximately 115 bpm with left axis deviation, a delayed precordial transition zone and nonspecific ST changes      ASSESSMENT / PLAN: On a clinical basis, the patient presents with evidence of atrial fibrillation with suboptimal rate control of an uncertain duration and at least by patient report minimal symptomatology. On this basis, I have discussed this extensively with her and have recommended optimization of her medical regimen inclusive of an increase of her beta-blocker dosage to provide further rate stabilization and reduce potential adverse risks of tachycardia in addition to that of initiation of oral anticoagulation to reduce risk of embolic events. An echocardiogram will be obtained to assess the presence or absence of additional structural abnormalities contributing to her atrial arrhythmias to guide additional management. Presently, I plan a short-term rate control and anticoagulation strategy until therapeutic anticoagulation has been established prior to the consideration of electrical cardioversion to both reduce risk of further adverse effects in the face of tachycardia as well as to assist in restoration of mechanical synchrony. She additionally will benefit from appropriate lifestyle modification to achieve weight reduction to benefit diastolic cardiac performance as well as to reduce risk of the development of obstructive sleep apnea with associated adverse cardiovascular effects. Ongoing aggressive risk factor modification of blood pressure and serum lipids remain essential to reducing risk of future atherosclerotic development.   I presently plan her clinical reassessment approximately 1 month and would happily reassess her in the interim should additional cardiovascular difficulties or concerns arise. Follow-up office visit in 1 month. Thank you for allowing me to participate in your patient's care. Please feel free to contact me if you have any questions or concerns. Note: This report was completed utilizing a computerized voice recognition software. Every effort has been made to insure accuracy, however; inadvertent computerized transcription errors may be present. Latia Irwin.  [facility], [address] [place] [address]    An electronic copy of this consult note was forwarded to Dr. Asif Bay

## 2022-04-13 DIAGNOSIS — F41.0 PANIC ANXIETY SYNDROME: ICD-10-CM

## 2022-04-13 RX ORDER — LORAZEPAM 0.5 MG/1
0.5 TABLET ORAL EVERY 8 HOURS PRN
Qty: 270 TABLET | Refills: 3 | Status: SHIPPED
Start: 2022-04-13 | End: 2022-07-18 | Stop reason: SDUPTHER

## 2022-05-08 ENCOUNTER — APPOINTMENT (OUTPATIENT)
Dept: CT IMAGING | Age: 77
End: 2022-05-08
Payer: OTHER MISCELLANEOUS

## 2022-05-08 ENCOUNTER — APPOINTMENT (OUTPATIENT)
Dept: GENERAL RADIOLOGY | Age: 77
End: 2022-05-08
Payer: OTHER MISCELLANEOUS

## 2022-05-08 ENCOUNTER — HOSPITAL ENCOUNTER (EMERGENCY)
Age: 77
Discharge: HOME | End: 2022-05-08
Attending: EMERGENCY MEDICINE
Payer: OTHER MISCELLANEOUS

## 2022-05-08 VITALS
DIASTOLIC BLOOD PRESSURE: 110 MMHG | HEART RATE: 98 BPM | SYSTOLIC BLOOD PRESSURE: 175 MMHG | RESPIRATION RATE: 14 BRPM | OXYGEN SATURATION: 98 % | TEMPERATURE: 97.6 F

## 2022-05-08 DIAGNOSIS — M79.645 BILATERAL THUMB PAIN: ICD-10-CM

## 2022-05-08 DIAGNOSIS — M54.2 NECK PAIN: ICD-10-CM

## 2022-05-08 DIAGNOSIS — M79.644 BILATERAL THUMB PAIN: ICD-10-CM

## 2022-05-08 DIAGNOSIS — Z79.01 ON ANTICOAGULANT THERAPY: ICD-10-CM
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
## 2022-05-08 DIAGNOSIS — I48.91 ATRIAL FIBRILLATION WITH RVR (HCC): ICD-10-CM

## 2022-05-08 LAB
ALBUMIN SERPL-MCNC: 4.3 G/DL (ref 3.5–5.2)
ALP BLD-CCNC: 80 U/L (ref 35–104)
ALT SERPL-CCNC: 28 U/L (ref 0–32)
ANION GAP SERPL CALCULATED.3IONS-SCNC: 14 MMOL/L (ref 7–16)
AST SERPL-CCNC: 40 U/L (ref 0–31)
BASOPHILS ABSOLUTE: 0.04 E9/L (ref 0–0.2)
BASOPHILS RELATIVE PERCENT: 0.4 % (ref 0–2)
BILIRUB SERPL-MCNC: 1 MG/DL (ref 0–1.2)
BUN BLDV-MCNC: 13 MG/DL (ref 6–23)
CALCIUM SERPL-MCNC: 9.6 MG/DL (ref 8.6–10.2)
CHLORIDE BLD-SCNC: 94 MMOL/L (ref 98–107)
CO2: 25 MMOL/L (ref 22–29)
CREAT SERPL-MCNC: 0.6 MG/DL (ref 0.5–1)
EKG ATRIAL RATE: 97 BPM
EKG Q-T INTERVAL: 312 MS
EKG QRS DURATION: 74 MS
EKG QTC CALCULATION (BAZETT): 462 MS
EKG R AXIS: -54 DEGREES
EKG T AXIS: 36 DEGREES
EKG VENTRICULAR RATE: 132 BPM
EOSINOPHILS ABSOLUTE: 0.07 E9/L (ref 0.05–0.5)
EOSINOPHILS RELATIVE PERCENT: 0.8 % (ref 0–6)
GFR AFRICAN AMERICAN: >60
GFR NON-AFRICAN AMERICAN: >60 ML/MIN/1.73
GLUCOSE BLD-MCNC: 112 MG/DL (ref 74–99)
HCT VFR BLD CALC: 45.5 % (ref 34–48)
HEMOGLOBIN: 15.3 G/DL (ref 11.5–15.5)
IMMATURE GRANULOCYTES #: 0.07 E9/L
IMMATURE GRANULOCYTES %: 0.8 % (ref 0–5)
LYMPHOCYTES ABSOLUTE: 1.24 E9/L (ref 1.5–4)
LYMPHOCYTES RELATIVE PERCENT: 13.4 % (ref 20–42)
MAGNESIUM: 1.9 MG/DL (ref 1.6–2.6)
MCH RBC QN AUTO: 31.9 PG (ref 26–35)
MCHC RBC AUTO-ENTMCNC: 33.6 % (ref 32–34.5)
MCV RBC AUTO: 94.8 FL (ref 80–99.9)
MONOCYTES ABSOLUTE: 0.84 E9/L (ref 0.1–0.95)
MONOCYTES RELATIVE PERCENT: 9.1 % (ref 2–12)
NEUTROPHILS ABSOLUTE: 6.98 E9/L (ref 1.8–7.3)
NEUTROPHILS RELATIVE PERCENT: 75.5 % (ref 43–80)
PDW BLD-RTO: 13 FL (ref 11.5–15)
PLATELET # BLD: 285 E9/L (ref 130–450)
PMV BLD AUTO: 8.8 FL (ref 7–12)
POTASSIUM REFLEX MAGNESIUM: 4.9 MMOL/L (ref 3.5–5)
RBC # BLD: 4.8 E12/L (ref 3.5–5.5)
REASON FOR REJECTION: NORMAL
REJECTED TEST: NORMAL
SODIUM BLD-SCNC: 133 MMOL/L (ref 132–146)
TOTAL PROTEIN: 7.9 G/DL (ref 6.4–8.3)
TROPONIN, HIGH SENSITIVITY: 15 NG/L (ref 0–9)
TROPONIN, HIGH SENSITIVITY: 15 NG/L (ref 0–9)
WBC # BLD: 9.2 E9/L (ref 4.5–11.5)

## 2022-05-08 PROCEDURE — 71045 X-RAY EXAM CHEST 1 VIEW: CPT

## 2022-05-08 PROCEDURE — 73130 X-RAY EXAM OF HAND: CPT

## 2022-05-08 PROCEDURE — 80053 COMPREHEN METABOLIC PANEL: CPT

## 2022-05-08 PROCEDURE — 70450 CT HEAD/BRAIN W/O DYE: CPT

## 2022-05-08 PROCEDURE — 84484 ASSAY OF TROPONIN QUANT: CPT

## 2022-05-08 PROCEDURE — 85025 COMPLETE CBC W/AUTO DIFF WBC: CPT

## 2022-05-08 PROCEDURE — 93005 ELECTROCARDIOGRAM TRACING: CPT | Performed by: STUDENT IN AN ORGANIZED HEALTH CARE EDUCATION/TRAINING PROGRAM

## 2022-05-08 PROCEDURE — 6370000000 HC RX 637 (ALT 250 FOR IP): Performed by: STUDENT IN AN ORGANIZED HEALTH CARE EDUCATION/TRAINING PROGRAM

## 2022-05-08 PROCEDURE — 83735 ASSAY OF MAGNESIUM: CPT

## 2022-05-08 PROCEDURE — 72125 CT NECK SPINE W/O DYE: CPT

## 2022-05-08 PROCEDURE — 36415 COLL VENOUS BLD VENIPUNCTURE: CPT

## 2022-05-08 PROCEDURE — 99285 EMERGENCY DEPT VISIT HI MDM: CPT

## 2022-05-08 PROCEDURE — 73110 X-RAY EXAM OF WRIST: CPT

## 2022-05-08 RX ORDER — LIDOCAINE 4 G/G
1 PATCH TOPICAL DAILY
Status: DISCONTINUED | OUTPATIENT
Start: 2022-05-08 | End: 2022-05-08 | Stop reason: HOSPADM

## 2022-05-08 RX ORDER — ACETAMINOPHEN 500 MG
1000 TABLET ORAL ONCE
Status: COMPLETED | OUTPATIENT
Start: 2022-05-08 | End: 2022-05-08

## 2022-05-08 RX ADMIN — ACETAMINOPHEN 1000 MG: 500 TABLET ORAL at 12:32

## 2022-05-08 ASSESSMENT — PAIN DESCRIPTION - LOCATION
LOCATION: NECK
LOCATION: NECK

## 2022-05-08 ASSESSMENT — PAIN SCALES - GENERAL
PAINLEVEL_OUTOF10: 2
PAINLEVEL_OUTOF10: 5
PAINLEVEL_OUTOF10: 5
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
## 2022-05-08 NOTE — ED NOTES
2 attempts at IV access. Unsuccessful at this time.  Charge nurse aware     Dionisio De Jesus RN  05/08/22 [id number]

## 2022-05-10 ENCOUNTER — TELEPHONE (OUTPATIENT)
Dept: CARDIOLOGY | Age: 77
End: 2022-05-10

## 2022-05-10 ASSESSMENT — ENCOUNTER SYMPTOMS
DIARRHEA: 0
NAUSEA: 0
BACK PAIN: 0
CONSTIPATION: 0
ABDOMINAL PAIN: 0
VOMITING: 0
RHINORRHEA: 0
SHORTNESS OF BREATH: 0
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
## 2022-05-31 ENCOUNTER — TELEPHONE (OUTPATIENT)
Dept: CARDIOLOGY | Age: 77
End: 2022-05-31

## 2022-05-31 NOTE — TELEPHONE ENCOUNTER
Patient had echo done at [facility].     Electronically signed by Fatimah Trejo on 5/31/2022 at 8:24 AM

## 2022-07-18 ENCOUNTER — OFFICE VISIT (OUTPATIENT)
Dept: FAMILY MEDICINE CLINIC | Age: 77
End: 2022-07-18
Payer: MEDICARE

## 2022-07-18 VITALS
BODY MASS INDEX: 32.39 KG/M2 | SYSTOLIC BLOOD PRESSURE: 126 MMHG | OXYGEN SATURATION: 98 % | HEART RATE: 87 BPM | DIASTOLIC BLOOD PRESSURE: 80 MMHG | WEIGHT: 176 LBS | RESPIRATION RATE: 18 BRPM | HEIGHT: 62 IN

## 2022-07-18 DIAGNOSIS — E55.9 VITAMIN D DEFICIENCY: ICD-10-CM

## 2022-07-18 DIAGNOSIS — M25.362 KNEE INSTABILITY, LEFT: ICD-10-CM

## 2022-07-18 DIAGNOSIS — E53.8 VITAMIN B 12 DEFICIENCY: ICD-10-CM

## 2022-07-18 DIAGNOSIS — R53.82 CHRONIC FATIGUE: ICD-10-CM

## 2022-07-18 DIAGNOSIS — M25.361 KNEE INSTABILITY, RIGHT: ICD-10-CM

## 2022-07-18 DIAGNOSIS — E78.2 MIXED HYPERLIPIDEMIA: ICD-10-CM

## 2022-07-18 DIAGNOSIS — R73.01 IFG (IMPAIRED FASTING GLUCOSE): ICD-10-CM

## 2022-07-18 DIAGNOSIS — F41.0 PANIC ANXIETY SYNDROME: ICD-10-CM

## 2022-07-18 DIAGNOSIS — G89.29 CHRONIC PAIN OF BOTH KNEES: Primary | ICD-10-CM

## 2022-07-18 DIAGNOSIS — M25.562 CHRONIC PAIN OF BOTH KNEES: Primary | ICD-10-CM

## 2022-07-18 DIAGNOSIS — E61.1 IRON DEFICIENCY: ICD-10-CM

## 2022-07-18 DIAGNOSIS — M25.561 CHRONIC PAIN OF BOTH KNEES: Primary | ICD-10-CM

## 2022-07-18 PROCEDURE — 99214 OFFICE O/P EST MOD 30 MIN: CPT | Performed by: FAMILY MEDICINE

## 2022-07-18 PROCEDURE — 1123F ACP DISCUSS/DSCN MKR DOCD: CPT | Performed by: FAMILY MEDICINE

## 2022-07-18 RX ORDER — LORAZEPAM 1 MG/1
1 TABLET ORAL EVERY 8 HOURS PRN
Qty: 120 TABLET | Refills: 3 | Status: SHIPPED | OUTPATIENT
Start: 2022-07-18 | End: 2023-07-18

## 2022-07-18 ASSESSMENT — PATIENT HEALTH QUESTIONNAIRE - PHQ9
SUM OF ALL RESPONSES TO PHQ QUESTIONS 1-9: 0
SUM OF ALL RESPONSES TO PHQ QUESTIONS 1-9: 0
1. LITTLE INTEREST OR PLEASURE IN DOING THINGS: 0
SUM OF ALL RESPONSES TO PHQ9 QUESTIONS 1 & 2: 0
2. FEELING DOWN, DEPRESSED OR HOPELESS: 0
SUM OF ALL RESPONSES TO PHQ QUESTIONS 1-9: 0
SUM OF ALL RESPONSES TO PHQ QUESTIONS 1-9: 0

## 2022-07-18 ASSESSMENT — LIFESTYLE VARIABLES: HOW OFTEN DO YOU HAVE A DRINK CONTAINING ALCOHOL: NEVER

## 2022-07-18 NOTE — PROGRESS NOTES
Lucien Bryant is a 65 y.o. female  . Subjective:      Was in bad accident on mothers day. Has improved considerably. Patient went to cardiology at [facility] for atrial fibrillation. Went to [address] [place] [address] for cardioversion. Cardioversion was successful. Started on eliquis. Esperanza this medication will be on it for life. Bilateral knee pain. Were to go ahead and get an x-ray and set up with [facility]. Ativan working well but since there is a lot of stress she is requesting a higher dose we discussed this medication. Patient understands habit forming nature of medication and assumes all risks. Patient will not drink alcohol , work or drive while on medication. Discussed pneumococcal vaccine. Patient wants to hold off on this for now discussed COVID-vaccine booster and discussed shingles vaccine. Review of Systems   Constitutional:  Negative for activity change, appetite change, chills, diaphoresis, fatigue, fever and unexpected weight change. HENT:  Negative for congestion, dental problem, drooling, ear discharge, ear pain, facial swelling, hearing loss, mouth sores, nosebleeds, postnasal drip, rhinorrhea, sinus pressure, sneezing, sore throat, tinnitus, trouble swallowing and voice change. Eyes:  Negative for photophobia, pain, discharge, redness, itching and visual disturbance. Respiratory:  Negative for apnea, cough, choking, chest tightness, shortness of breath, wheezing and stridor. Cardiovascular:  Negative for chest pain, palpitations and leg swelling. Gastrointestinal:  Negative for abdominal distention, abdominal pain, anal bleeding, blood in stool, constipation, diarrhea, nausea, rectal pain and vomiting. Endocrine: Negative for cold intolerance, heat intolerance, polydipsia, polyphagia and polyuria.    Genitourinary:  Negative for decreased urine volume, difficulty urinating, dyspareunia, dysuria, enuresis, flank pain, frequency, genital sores, hematuria, menstrual problem, pelvic pain, urgency, vaginal bleeding, vaginal discharge and vaginal pain. Musculoskeletal:  Positive for arthralgias and joint swelling. Negative for back pain, gait problem, myalgias, neck pain and neck stiffness. Skin:  Negative for color change, pallor, rash and wound. Allergic/Immunologic: Negative for environmental allergies, food allergies and immunocompromised state. Neurological:  Negative for dizziness, tremors, seizures, syncope, facial asymmetry, speech difficulty, weakness, light-headedness, numbness and headaches. Hematological:  Negative for adenopathy. Does not bruise/bleed easily. Psychiatric/Behavioral:  Negative for agitation, behavioral problems, confusion, decreased concentration, dysphoric mood, hallucinations, self-injury, sleep disturbance and suicidal ideas. The patient is nervous/anxious. The patient is not hyperactive.       Past Medical History:   Diagnosis Date    Anxiety     Arthritis     Hypertension     Pure hypercholesterolemia 2/18/2022       Social History     Socioeconomic History    Marital status:      Spouse name: Not on file    Number of children: Not on file    Years of education: Not on file    Highest education level: Not on file   Occupational History    Not on file   Tobacco Use    Smoking status: Never    Smokeless tobacco: Never   Vaping Use    Vaping Use: Never used   Substance and Sexual Activity    Alcohol use: No    Drug use: No    Sexual activity: Not Currently     Partners: Male   Other Topics Concern    Not on file   Social History Narrative    Not on file     Social Determinants of Health     Financial Resource Strain: Low Risk     Difficulty of Paying Living Expenses: Not very hard   Food Insecurity: No Food Insecurity    Worried About Running Out of Food in the Last Year: Never true    Ran Out of Food in the Last Year: Never true   Transportation Needs: Not on file   Physical Activity: Not on file   Stress: Not on file Social Connections: Not on file   Intimate Partner Violence: Not on file   Housing Stability: Not on file       No family history on file. Current Outpatient Medications on File Prior to Visit   Medication Sig Dispense Refill    LORazepam (ATIVAN) 0.5 MG tablet Take 1 tablet by mouth every 8 hours as needed for Anxiety. 270 tablet 3    vitamin C (ASCORBIC ACID) 500 MG tablet Take 500 mg by mouth daily      apixaban (ELIQUIS) 5 MG TABS tablet Take 1 tablet by mouth 2 times daily 60 tablet 5    losartan (COZAAR) 50 MG tablet TAKE 1 TABLET BY MOUTH EVERY DAY 90 tablet 5    hydroCHLOROthiazide (HYDRODIURIL) 12.5 MG tablet Take 1 tablet by mouth daily 90 tablet 5    metoprolol succinate (TOPROL XL) 50 MG extended release tablet Take 1 tablet by mouth daily (Patient taking differently: Take 50 mg by mouth in the morning and 50 mg before bedtime.) 90 tablet 5    acetaminophen (TYLENOL) 650 MG extended release tablet Tylenol Arthritis Pain 650 mg tablet,extended release   Take 1 tablet every 8 hours by oral route.      ketoconazole (NIZORAL) 2 % cream ketoconazole 2 % topical cream   APPLY TO THE AFFECTED AREA(S) BY TOPICAL ROUTE ONCE DAILY      mupirocin (BACTROBAN) 2 % cream Apply at bedtime to bilateral nostrils as directed. 30 g 5    Garlic 683 MG TABS Take by mouth      RED YEAST RICE by Does not apply route. therapeutic multivitamin-minerals (THERAGRAN-M) tablet Take 1 tablet by mouth daily. Coenzyme Q10 (CO Q 10) 10 MG CAPS Take  by mouth. Vitamin D (CHOLECALCIFEROL) 1000 UNITS CAPS capsule Take 1,000 Units by mouth daily. Probiotic Product (PROBIOTIC FORMULA PO) Take  by mouth.      loratadine (CLARITIN) 10 MG tablet Take 10 mg by mouth daily. No current facility-administered medications on file prior to visit.        Allergies   Allergen Reactions    Clarithromycin Nausea Only    Codeine     Procaine Hcl     Sulfa Antibiotics     Tetanus Toxoids     Tetanus-Diphtheria Toxoids Td Other reaction(s): Edema       I have reviewedher allergies, medications, problem list, medical, social and family history and have updated as needed in the electronic medical record. Objective:     Physical Exam  Vitals and nursing note reviewed. Constitutional:       General: She is not in acute distress. Appearance: She is well-developed. She is not diaphoretic. HENT:      Head: Normocephalic and atraumatic. Right Ear: External ear normal.      Left Ear: External ear normal.      Nose: Nose normal.      Mouth/Throat:      Pharynx: No oropharyngeal exudate. Eyes:      General: No scleral icterus. Right eye: No discharge. Left eye: No discharge. Conjunctiva/sclera: Conjunctivae normal.      Pupils: Pupils are equal, round, and reactive to light. Neck:      Thyroid: No thyromegaly. Vascular: No JVD. Trachea: No tracheal deviation. Cardiovascular:      Rate and Rhythm: Normal rate and regular rhythm. Heart sounds: Normal heart sounds. No murmur heard. No friction rub. No gallop. Pulmonary:      Effort: Pulmonary effort is normal. No respiratory distress. Breath sounds: Normal breath sounds. No stridor. No wheezing, rhonchi or rales. Chest:      Chest wall: No tenderness. Abdominal:      General: Bowel sounds are normal. There is no distension. Palpations: Abdomen is soft. There is no mass. Tenderness: There is no abdominal tenderness. There is no guarding or rebound. Genitourinary:     Comments: Will follow with own gynecologist for gynecological and breast care. Musculoskeletal:         General: No tenderness. Normal range of motion. Cervical back: Normal range of motion and neck supple. Lymphadenopathy:      Cervical: No cervical adenopathy. Skin:     General: Skin is warm and dry. Coloration: Skin is not pale. Findings: No erythema or rash.    Neurological:      Mental Status: She is alert and oriented to person, place, and time. Cranial Nerves: No cranial nerve deficit. Motor: No abnormal muscle tone. Coordination: Coordination normal.      Deep Tendon Reflexes: Reflexes are normal and symmetric. Reflexes normal.   Psychiatric:         Behavior: Behavior normal.         Thought Content: Thought content normal.         Judgment: Judgment normal.       Assessment / Plan:   Balbir Villanueva was seen today for knee pain and medication refill. Diagnoses and all orders for this visit:    Chronic pain of both knees  -     XR KNEE LEFT (3 VIEWS); Future  -     XR KNEE RIGHT (3 VIEWS); Future  -     External Referral To Orthopedic Surgery  -     diclofenac sodium (VOLTAREN) 1 % GEL; Apply 4 g topically in the morning and 4 g before bedtime. Knee instability, right  -     External Referral To Orthopedic Surgery    Knee instability, left  -     External Referral To Orthopedic Surgery    Panic anxiety syndrome  -     LORazepam (ATIVAN) 1 MG tablet; Take 1 tablet by mouth every 8 hours as needed for Anxiety. IFG (impaired fasting glucose)  -     Hemoglobin A1C; Future  -     MICROALBUMIN, UR; Future    Mixed hyperlipidemia  -     Lipid Panel; Future    Chronic fatigue  -     CBC; Future  -     Comprehensive Metabolic Panel; Future  -     TSH; Future  -     T4, Free; Future    Vitamin D deficiency  -     Vitamin D 25 Hydroxy; Future    Iron deficiency  -     Iron and TIBC; Future    Vitamin B 12 deficiency  -     Vitamin B12 & Folate; Future      Willfollow with own gynecologist for gynecological and breast care. Reviewed health maintenance report. Patient is aware of deficiencies and suggested preventative tests.

## 2022-07-19 ASSESSMENT — ENCOUNTER SYMPTOMS
CHOKING: 0
TROUBLE SWALLOWING: 0
SINUS PRESSURE: 0
ABDOMINAL PAIN: 0
SHORTNESS OF BREATH: 0
COLOR CHANGE: 0
ANAL BLEEDING: 0
APNEA: 0
DIARRHEA: 0
WHEEZING: 0
RECTAL PAIN: 0
VOMITING: 0
BLOOD IN STOOL: 0
VOICE CHANGE: 0
EYE DISCHARGE: 0
SORE THROAT: 0
EYE PAIN: 0
PHOTOPHOBIA: 0
BACK PAIN: 0
STRIDOR: 0
COUGH: 0
EYE REDNESS: 0
FACIAL SWELLING: 0
RHINORRHEA: 0
CONSTIPATION: 0
CHEST TIGHTNESS: 0
ABDOMINAL DISTENTION: 0
EYE ITCHING: 0
NAUSEA: 0

## 2022-07-22 DIAGNOSIS — R53.82 CHRONIC FATIGUE: ICD-10-CM

## 2022-07-22 DIAGNOSIS — E55.9 VITAMIN D DEFICIENCY: ICD-10-CM

## 2022-07-22 DIAGNOSIS — E61.1 IRON DEFICIENCY: ICD-10-CM

## 2022-07-22 DIAGNOSIS — E53.8 VITAMIN B 12 DEFICIENCY: ICD-10-CM

## 2022-07-22 DIAGNOSIS — E78.2 MIXED HYPERLIPIDEMIA: ICD-10-CM

## 2022-07-22 DIAGNOSIS — R73.01 IFG (IMPAIRED FASTING GLUCOSE): ICD-10-CM

## 2022-07-22 LAB
ALBUMIN SERPL-MCNC: 4.7 G/DL (ref 3.5–5.2)
ALP BLD-CCNC: 73 U/L (ref 35–104)
ALT SERPL-CCNC: 26 U/L (ref 0–32)
ANION GAP SERPL CALCULATED.3IONS-SCNC: 15 MMOL/L (ref 7–16)
AST SERPL-CCNC: 31 U/L (ref 0–31)
BILIRUB SERPL-MCNC: 1 MG/DL (ref 0–1.2)
BUN BLDV-MCNC: 13 MG/DL (ref 6–23)
CALCIUM SERPL-MCNC: 9.8 MG/DL (ref 8.6–10.2)
CHLORIDE BLD-SCNC: 95 MMOL/L (ref 98–107)
CHOLESTEROL, TOTAL: 212 MG/DL (ref 0–199)
CO2: 25 MMOL/L (ref 22–29)
CREAT SERPL-MCNC: 0.7 MG/DL (ref 0.5–1)
FOLATE: >20 NG/ML (ref 4.8–24.2)
GFR AFRICAN AMERICAN: >60
GFR NON-AFRICAN AMERICAN: >60 ML/MIN/1.73
GLUCOSE BLD-MCNC: 108 MG/DL (ref 74–99)
HBA1C MFR BLD: 6 % (ref 4–5.6)
HCT VFR BLD CALC: 41.2 % (ref 34–48)
HDLC SERPL-MCNC: 66 MG/DL
HEMOGLOBIN: 14.1 G/DL (ref 11.5–15.5)
IRON SATURATION: 38 % (ref 15–50)
IRON: 111 MCG/DL (ref 37–145)
LDL CHOLESTEROL CALCULATED: 126 MG/DL (ref 0–99)
MCH RBC QN AUTO: 31.8 PG (ref 26–35)
MCHC RBC AUTO-ENTMCNC: 34.2 % (ref 32–34.5)
MCV RBC AUTO: 92.8 FL (ref 80–99.9)
MICROALBUMIN UR-MCNC: 17.7 MG/L
PDW BLD-RTO: 13.5 FL (ref 11.5–15)
PLATELET # BLD: 309 E9/L (ref 130–450)
PMV BLD AUTO: 9.4 FL (ref 7–12)
POTASSIUM SERPL-SCNC: 4.1 MMOL/L (ref 3.5–5)
RBC # BLD: 4.44 E12/L (ref 3.5–5.5)
SODIUM BLD-SCNC: 135 MMOL/L (ref 132–146)
T4 FREE: 1.15 NG/DL (ref 0.93–1.7)
TOTAL IRON BINDING CAPACITY: 292 MCG/DL (ref 250–450)
TOTAL PROTEIN: 8 G/DL (ref 6.4–8.3)
TRIGL SERPL-MCNC: 99 MG/DL (ref 0–149)
TSH SERPL DL<=0.05 MIU/L-ACNC: 2.49 UIU/ML (ref 0.27–4.2)
VITAMIN B-12: 763 PG/ML (ref 211–946)
VITAMIN D 25-HYDROXY: 44 NG/ML (ref 30–100)
VLDLC SERPL CALC-MCNC: 20 MG/DL
WBC # BLD: 6.5 E9/L (ref 4.5–11.5)

## 2022-07-23 DIAGNOSIS — R73.01 IFG (IMPAIRED FASTING GLUCOSE): ICD-10-CM

## 2022-07-23 DIAGNOSIS — E78.00 PURE HYPERCHOLESTEROLEMIA: Primary | ICD-10-CM

## 2022-09-27 ENCOUNTER — OFFICE VISIT (OUTPATIENT)
Dept: FAMILY MEDICINE CLINIC | Age: 77
End: 2022-09-27
Payer: MEDICARE

## 2022-09-27 VITALS
OXYGEN SATURATION: 95 % | SYSTOLIC BLOOD PRESSURE: 139 MMHG | BODY MASS INDEX: 31.47 KG/M2 | DIASTOLIC BLOOD PRESSURE: 75 MMHG | HEART RATE: 75 BPM | TEMPERATURE: 97.9 F | WEIGHT: 171 LBS | RESPIRATION RATE: 18 BRPM | HEIGHT: 62 IN

## 2022-09-27 DIAGNOSIS — N39.0 URINARY TRACT INFECTION WITH HEMATURIA, SITE UNSPECIFIED: ICD-10-CM

## 2022-09-27 DIAGNOSIS — R30.0 DYSURIA: Primary | ICD-10-CM

## 2022-09-27 DIAGNOSIS — B37.31 VAGINAL YEAST INFECTION: ICD-10-CM

## 2022-09-27 DIAGNOSIS — R31.9 URINARY TRACT INFECTION WITH HEMATURIA, SITE UNSPECIFIED: ICD-10-CM

## 2022-09-27 LAB
BILIRUBIN, POC: NEGATIVE
BLOOD URINE, POC: NORMAL
CLARITY, POC: NORMAL
COLOR, POC: YELLOW
GLUCOSE URINE, POC: NEGATIVE
KETONES, POC: NEGATIVE
LEUKOCYTE EST, POC: NORMAL
NITRITE, POC: NEGATIVE
PH, POC: 7.5
PROTEIN, POC: NEGATIVE
SPECIFIC GRAVITY, POC: 1.01
UROBILINOGEN, POC: 0.2

## 2022-09-27 PROCEDURE — 81002 URINALYSIS NONAUTO W/O SCOPE: CPT | Performed by: NURSE PRACTITIONER

## 2022-09-27 PROCEDURE — 99213 OFFICE O/P EST LOW 20 MIN: CPT | Performed by: NURSE PRACTITIONER

## 2022-09-27 PROCEDURE — 1123F ACP DISCUSS/DSCN MKR DOCD: CPT | Performed by: NURSE PRACTITIONER

## 2022-09-27 RX ORDER — NITROFURANTOIN 25; 75 MG/1; MG/1
100 CAPSULE ORAL 2 TIMES DAILY
Qty: 10 CAPSULE | Refills: 0 | Status: SHIPPED | OUTPATIENT
Start: 2022-09-27 | End: 2022-10-02

## 2022-09-27 RX ORDER — FLUCONAZOLE 150 MG/1
150 TABLET ORAL ONCE
Qty: 1 TABLET | Refills: 0 | Status: SHIPPED | OUTPATIENT
Start: 2022-09-27 | End: 2022-09-27

## 2022-09-27 NOTE — PROGRESS NOTES
Chief Complaint       Urinary Frequency (Itching, frequent urination, suprapubic pressure for the last 4 days)    History of Present Illness   Source of history provided by:  patient. Asif Zavala is a 65 y.o. old female presenting to the walk in clinic for evaluation of dysuria x 4 days, reports vaginal discharge. Reports associated frequency, urgency, and suprapubic pressure. Denies gross hematuria. Denies associated flank pain. Denies any fever, chills, vaginal bleeding, possibility of pregnancy, vomiting, diarrhea, or lethargy. No LMP recorded. Patient has had a hysterectomy. Denies hx of kidney calculi or multiple UTI's, does not follow with urology. ROS    Unless otherwise stated in this report or unable to obtain because of the patient's clinical or mental status as evidenced by the medical record, this patients's positive and negative responses for Review of Systems, constitutional, psych, eyes, ENT, cardiovascular, respiratory, gastrointestinal, neurological, genitourinary, musculoskeletal, integument systems and systems related to the presenting problem are either stated in the preceding or were not pertinent or were negative for the symptoms and/or complaints related to the medical problem. Past Medical History:  has a past medical history of Anxiety, Arthritis, Hypertension, and Pure hypercholesterolemia. Past Surgical History:  has a past surgical history that includes Hysterectomy; Breast surgery; Tonsillectomy; and joint replacement. Social History:  reports that she has never smoked. She has never used smokeless tobacco. She reports that she does not drink alcohol and does not use drugs. Family History: family history is not on file.    Allergies: Clarithromycin, Codeine, Procaine hcl, Sulfa antibiotics, Tetanus toxoids, and Tetanus-diphtheria toxoids td    Physical Exam         VS:  /75   Pulse 75   Temp 97.9 °F (36.6 °C) (Temporal)   Resp 18   Ht 5' 2\" (1.575 m)   Wt 171 lb (77.6 kg)   SpO2 95%   BMI 31.28 kg/m²    Oxygen Saturation Interpretation: Normal.    Constitutional:  A&Ox3, development consistent with age, NAD. Lungs:  CTAB without wheezing, rales, or rhonchi. .Back: No CVA tenderness. Skin:  Normal turgor. Warm, dry, without visible rash, unless noted elsewhere. Neurological:  Alert and oriented. Motor functions intact. Responds to verbal commands. Lab / Imaging Results   (All laboratory and radiology results have been personally reviewed by myself)  Labs:  Results for orders placed or performed in visit on 09/27/22   POCT Urinalysis no Micro   Result Value Ref Range    Color, UA Yellow     Clarity, UA Cloudy     Glucose, UA POC Negative     Bilirubin, UA Negative     Ketones, UA Negative     Spec Grav, UA 1.015     Blood, UA POC Trace-Intact     pH, UA 7.5     Protein, UA POC Negative     Urobilinogen, UA 0.2     Leukocytes, UA Large     Nitrite, UA Negative        Imaging: All Radiology results interpreted by Radiologist unless otherwise noted. Assessment / Plan     Impression(s):  Majo Torres was seen today for urinary frequency. Diagnoses and all orders for this visit:    Dysuria  -     POCT Urinalysis no Micro  -     Cancel: Culture, Urine; Future  -     Culture, Urine    Urinary tract infection with hematuria, site unspecified  -     nitrofurantoin, macrocrystal-monohydrate, (MACROBID) 100 MG capsule; Take 1 capsule by mouth 2 times daily for 5 days    Vaginal yeast infection  -     fluconazole (DIFLUCAN) 150 MG tablet; Take 1 tablet by mouth once for 1 dose    UA appears (+) for a UTI. Urine C&S pending, will mychart results once available. Script written, side effects discussed. Increase fluids and rest. F/u PCP in 3-5 days if symptoms persist. ED sooner if symptoms worsen or change. ED immediately with the development of fever, shaking chills, body aches, flank pain, vomiting, CP, or SOB. Pt is in agreement with this care plan.  All questions answered. Alex Jamison, APRN - CNP    **This report was transcribed using voice recognition software. Every effort was made to ensure accuracy; however, inadvertent computerized transcription errors may be present.

## 2022-09-29 LAB — URINE CULTURE, ROUTINE: NORMAL

## 2022-10-31 DIAGNOSIS — R73.01 IFG (IMPAIRED FASTING GLUCOSE): ICD-10-CM

## 2022-10-31 DIAGNOSIS — E78.00 PURE HYPERCHOLESTEROLEMIA: ICD-10-CM

## 2022-10-31 LAB
ALBUMIN SERPL-MCNC: 4.4 G/DL (ref 3.5–5.2)
ALP BLD-CCNC: 67 U/L (ref 35–104)
ALT SERPL-CCNC: 22 U/L (ref 0–32)
ANION GAP SERPL CALCULATED.3IONS-SCNC: 14 MMOL/L (ref 7–16)
AST SERPL-CCNC: 22 U/L (ref 0–31)
BILIRUB SERPL-MCNC: 0.9 MG/DL (ref 0–1.2)
BUN BLDV-MCNC: 13 MG/DL (ref 6–23)
CALCIUM SERPL-MCNC: 10.3 MG/DL (ref 8.6–10.2)
CHLORIDE BLD-SCNC: 97 MMOL/L (ref 98–107)
CHOLESTEROL, TOTAL: 203 MG/DL (ref 0–199)
CO2: 26 MMOL/L (ref 22–29)
CREAT SERPL-MCNC: 0.7 MG/DL (ref 0.5–1)
GFR SERPL CREATININE-BSD FRML MDRD: >60 ML/MIN/1.73
GLUCOSE BLD-MCNC: 93 MG/DL (ref 74–99)
HBA1C MFR BLD: 5.3 % (ref 4–5.6)
HDLC SERPL-MCNC: 50 MG/DL
LDL CHOLESTEROL CALCULATED: 117 MG/DL (ref 0–99)
POTASSIUM SERPL-SCNC: 4.2 MMOL/L (ref 3.5–5)
SODIUM BLD-SCNC: 137 MMOL/L (ref 132–146)
TOTAL PROTEIN: 7.5 G/DL (ref 6.4–8.3)
TRIGL SERPL-MCNC: 180 MG/DL (ref 0–149)
VLDLC SERPL CALC-MCNC: 36 MG/DL

## 2022-11-10 ENCOUNTER — OFFICE VISIT (OUTPATIENT)
Dept: FAMILY MEDICINE CLINIC | Age: 77
End: 2022-11-10
Payer: MEDICARE

## 2022-11-10 VITALS
HEIGHT: 62 IN | OXYGEN SATURATION: 98 % | HEART RATE: 88 BPM | RESPIRATION RATE: 18 BRPM | WEIGHT: 174 LBS | BODY MASS INDEX: 32.02 KG/M2 | DIASTOLIC BLOOD PRESSURE: 80 MMHG | SYSTOLIC BLOOD PRESSURE: 132 MMHG

## 2022-11-10 DIAGNOSIS — I48.0 PAROXYSMAL ATRIAL FIBRILLATION (HCC): ICD-10-CM

## 2022-11-10 DIAGNOSIS — F41.0 PANIC ANXIETY SYNDROME: ICD-10-CM

## 2022-11-10 DIAGNOSIS — R53.82 CHRONIC FATIGUE: ICD-10-CM

## 2022-11-10 DIAGNOSIS — J30.89 NON-SEASONAL ALLERGIC RHINITIS, UNSPECIFIED TRIGGER: Primary | ICD-10-CM

## 2022-11-10 DIAGNOSIS — I10 PRIMARY HYPERTENSION: ICD-10-CM

## 2022-11-10 DIAGNOSIS — E61.1 IRON DEFICIENCY: ICD-10-CM

## 2022-11-10 DIAGNOSIS — R73.01 IFG (IMPAIRED FASTING GLUCOSE): ICD-10-CM

## 2022-11-10 DIAGNOSIS — E53.8 VITAMIN B 12 DEFICIENCY: ICD-10-CM

## 2022-11-10 DIAGNOSIS — E78.00 PURE HYPERCHOLESTEROLEMIA: ICD-10-CM

## 2022-11-10 PROCEDURE — 3074F SYST BP LT 130 MM HG: CPT | Performed by: FAMILY MEDICINE

## 2022-11-10 PROCEDURE — 99214 OFFICE O/P EST MOD 30 MIN: CPT | Performed by: FAMILY MEDICINE

## 2022-11-10 PROCEDURE — 1123F ACP DISCUSS/DSCN MKR DOCD: CPT | Performed by: FAMILY MEDICINE

## 2022-11-10 PROCEDURE — 3078F DIAST BP <80 MM HG: CPT | Performed by: FAMILY MEDICINE

## 2022-11-10 RX ORDER — METOPROLOL SUCCINATE 50 MG/1
50 TABLET, EXTENDED RELEASE ORAL 2 TIMES DAILY
Qty: 180 TABLET | Refills: 3 | Status: SHIPPED | OUTPATIENT
Start: 2022-11-10

## 2022-11-10 RX ORDER — IPRATROPIUM BROMIDE 42 UG/1
2 SPRAY, METERED NASAL 4 TIMES DAILY
Qty: 15 ML | Refills: 4 | Status: SHIPPED | OUTPATIENT
Start: 2022-11-10

## 2022-11-10 RX ORDER — LORAZEPAM 1 MG/1
1 TABLET ORAL EVERY 8 HOURS PRN
Qty: 120 TABLET | Refills: 3 | Status: SHIPPED | OUTPATIENT
Start: 2022-11-10 | End: 2023-11-10

## 2022-11-10 ASSESSMENT — PATIENT HEALTH QUESTIONNAIRE - PHQ9
SUM OF ALL RESPONSES TO PHQ QUESTIONS 1-9: 0
SUM OF ALL RESPONSES TO PHQ9 QUESTIONS 1 & 2: 0
2. FEELING DOWN, DEPRESSED OR HOPELESS: 0
1. LITTLE INTEREST OR PLEASURE IN DOING THINGS: 0

## 2022-11-10 ASSESSMENT — LIFESTYLE VARIABLES: HOW OFTEN DO YOU HAVE A DRINK CONTAINING ALCOHOL: NEVER

## 2022-11-10 NOTE — PROGRESS NOTES
Shiloh Irene is a 65 y.o. female  . Subjective:      Tylenol arthritis is working well. Anxiety well controlled . Discussed sinus drainage. We will repeat blood work. Review of Systems   Constitutional:  Positive for fatigue. Negative for activity change, appetite change, chills, diaphoresis, fever and unexpected weight change. HENT:  Negative for congestion, dental problem, drooling, ear discharge, ear pain, facial swelling, hearing loss, mouth sores, nosebleeds, postnasal drip, rhinorrhea, sinus pressure, sneezing, sore throat, tinnitus, trouble swallowing and voice change. Eyes:  Negative for photophobia, pain, discharge, redness, itching and visual disturbance. Respiratory:  Negative for apnea, cough, choking, chest tightness, shortness of breath, wheezing and stridor. Cardiovascular:  Negative for chest pain, palpitations and leg swelling. Gastrointestinal:  Negative for abdominal distention, abdominal pain, anal bleeding, blood in stool, constipation, diarrhea, nausea, rectal pain and vomiting. Endocrine: Negative for cold intolerance, heat intolerance, polydipsia, polyphagia and polyuria. Genitourinary:  Negative for decreased urine volume, difficulty urinating, dyspareunia, dysuria, enuresis, flank pain, frequency, genital sores, hematuria, menstrual problem, pelvic pain, urgency, vaginal bleeding, vaginal discharge and vaginal pain. Musculoskeletal:  Negative for arthralgias, back pain, gait problem, joint swelling, myalgias, neck pain and neck stiffness. Skin:  Negative for color change, pallor, rash and wound. Allergic/Immunologic: Negative for environmental allergies, food allergies and immunocompromised state. Neurological:  Negative for dizziness, tremors, seizures, syncope, facial asymmetry, speech difficulty, weakness, light-headedness, numbness and headaches. Hematological:  Negative for adenopathy. Does not bruise/bleed easily.    Psychiatric/Behavioral:  Negative for agitation, behavioral problems, confusion, decreased concentration, dysphoric mood, hallucinations, self-injury, sleep disturbance and suicidal ideas. The patient is nervous/anxious. The patient is not hyperactive. Past Medical History:   Diagnosis Date    Anxiety     Arthritis     Hypertension     Pure hypercholesterolemia 2/18/2022       Social History     Socioeconomic History    Marital status:      Spouse name: Not on file    Number of children: Not on file    Years of education: Not on file    Highest education level: Not on file   Occupational History    Not on file   Tobacco Use    Smoking status: Never    Smokeless tobacco: Never   Vaping Use    Vaping Use: Never used   Substance and Sexual Activity    Alcohol use: No    Drug use: No    Sexual activity: Not Currently     Partners: Male   Other Topics Concern    Not on file   Social History Narrative    Not on file     Social Determinants of Health     Financial Resource Strain: Low Risk     Difficulty of Paying Living Expenses: Not very hard   Food Insecurity: No Food Insecurity    Worried About Running Out of Food in the Last Year: Never true    Ran Out of Food in the Last Year: Never true   Transportation Needs: Not on file   Physical Activity: Not on file   Stress: Not on file   Social Connections: Not on file   Intimate Partner Violence: Not on file   Housing Stability: Not on file       No family history on file. Current Outpatient Medications on File Prior to Visit   Medication Sig Dispense Refill    diclofenac sodium (VOLTAREN) 1 % GEL Apply 4 g topically in the morning and 4 g before bedtime.  350 g 5    vitamin C (ASCORBIC ACID) 500 MG tablet Take 500 mg by mouth daily      apixaban (ELIQUIS) 5 MG TABS tablet Take 1 tablet by mouth 2 times daily 60 tablet 5    losartan (COZAAR) 50 MG tablet TAKE 1 TABLET BY MOUTH EVERY DAY 90 tablet 5    hydroCHLOROthiazide (HYDRODIURIL) 12.5 MG tablet Take 1 tablet by mouth daily 90 tablet 5 acetaminophen (TYLENOL) 650 MG extended release tablet Tylenol Arthritis Pain 650 mg tablet,extended release   Take 1 tablet every 8 hours by oral route.      ketoconazole (NIZORAL) 2 % cream       mupirocin (BACTROBAN) 2 % cream Apply at bedtime to bilateral nostrils as directed. 30 g 5    RED YEAST RICE by Does not apply route. therapeutic multivitamin-minerals (THERAGRAN-M) tablet Take 1 tablet by mouth daily. Coenzyme Q10 (CO Q 10) 10 MG CAPS Take  by mouth. Vitamin D (CHOLECALCIFEROL) 1000 UNITS CAPS capsule Take 1,000 Units by mouth daily      Probiotic Product (PROBIOTIC FORMULA PO) Take  by mouth.      loratadine (CLARITIN) 10 MG tablet Take 10 mg by mouth daily. No current facility-administered medications on file prior to visit. Allergies   Allergen Reactions    Clarithromycin Nausea Only    Codeine     Procaine Hcl     Sulfa Antibiotics     Tetanus Toxoids     Tetanus-Diphtheria Toxoids Td      Other reaction(s): Edema       I have reviewedher allergies, medications, problem list, medical, social and family history and have updated as needed in the electronic medical record. Objective:     Physical Exam  Vitals and nursing note reviewed. Constitutional:       General: She is not in acute distress. Appearance: She is well-developed. She is not diaphoretic. HENT:      Head: Normocephalic and atraumatic. Right Ear: External ear normal.      Left Ear: External ear normal.      Nose: Nose normal.      Mouth/Throat:      Pharynx: No oropharyngeal exudate. Eyes:      General: No scleral icterus. Right eye: No discharge. Left eye: No discharge. Conjunctiva/sclera: Conjunctivae normal.      Pupils: Pupils are equal, round, and reactive to light. Neck:      Thyroid: No thyromegaly. Vascular: No JVD. Trachea: No tracheal deviation. Cardiovascular:      Rate and Rhythm: Normal rate and regular rhythm.       Heart sounds: Normal heart sounds. No murmur heard. No friction rub. No gallop. Pulmonary:      Effort: Pulmonary effort is normal. No respiratory distress. Breath sounds: Normal breath sounds. No stridor. No wheezing or rales. Chest:      Chest wall: No tenderness. Abdominal:      General: Bowel sounds are normal. There is no distension. Palpations: Abdomen is soft. There is no mass. Tenderness: There is no abdominal tenderness. There is no guarding or rebound. Genitourinary:     Comments: Will follow with own gynecologist for gynecological and breast care. Musculoskeletal:         General: No tenderness. Normal range of motion. Cervical back: Normal range of motion and neck supple. Lymphadenopathy:      Cervical: No cervical adenopathy. Skin:     General: Skin is warm and dry. Coloration: Skin is not pale. Findings: No erythema or rash. Neurological:      Mental Status: She is alert and oriented to person, place, and time. Cranial Nerves: No cranial nerve deficit. Motor: No abnormal muscle tone. Coordination: Coordination normal.      Deep Tendon Reflexes: Reflexes are normal and symmetric. Reflexes normal.   Psychiatric:         Behavior: Behavior normal.         Thought Content: Thought content normal.         Judgment: Judgment normal.       Assessment / Plan:   Negrita Way was seen today for 3 month follow-up. Diagnoses and all orders for this visit:    Panic anxiety syndrome  -     LORazepam (ATIVAN) 1 MG tablet; Take 1 tablet by mouth every 8 hours as needed for Anxiety. Primary hypertension  -     metoprolol succinate (TOPROL XL) 50 MG extended release tablet; Take 1 tablet by mouth 2 times daily        Willfollow with own gynecologist for gynecological and breast care. Reviewed health maintenance report. Patient is aware of deficiencies and suggested preventative tests.

## 2022-11-14 ASSESSMENT — ENCOUNTER SYMPTOMS
RECTAL PAIN: 0
STRIDOR: 0
SHORTNESS OF BREATH: 0
EYE DISCHARGE: 0
NAUSEA: 0
SINUS PRESSURE: 0
SORE THROAT: 0
RHINORRHEA: 0
PHOTOPHOBIA: 0
FACIAL SWELLING: 0
VOICE CHANGE: 0
CONSTIPATION: 0
BLOOD IN STOOL: 0
EYE REDNESS: 0
EYE ITCHING: 0
WHEEZING: 0
CHEST TIGHTNESS: 0
ABDOMINAL PAIN: 0
ABDOMINAL DISTENTION: 0
VOMITING: 0
COUGH: 0
BACK PAIN: 0
COLOR CHANGE: 0
ANAL BLEEDING: 0
APNEA: 0
CHOKING: 0
DIARRHEA: 0
EYE PAIN: 0
TROUBLE SWALLOWING: 0

## 2022-11-16 ENCOUNTER — TELEMEDICINE (OUTPATIENT)
Dept: FAMILY MEDICINE CLINIC | Age: 77
End: 2022-11-16
Payer: MEDICARE

## 2022-11-16 DIAGNOSIS — J01.00 ACUTE NON-RECURRENT MAXILLARY SINUSITIS: Primary | ICD-10-CM

## 2022-11-16 PROCEDURE — 99211 OFF/OP EST MAY X REQ PHY/QHP: CPT | Performed by: FAMILY MEDICINE

## 2022-11-16 RX ORDER — AMOXICILLIN 500 MG/1
500 CAPSULE ORAL 2 TIMES DAILY
Qty: 20 CAPSULE | Refills: 0 | Status: SHIPPED | OUTPATIENT
Start: 2022-11-16 | End: 2022-11-26

## 2022-11-18 NOTE — PROGRESS NOTES
Patient's video visit failed detention through. She requested amoxicillin for her sinus infection. This is one of the few that she tolerates. She is having sore throat no shortness of breath. If symptoms do not improve or worsen she is to be seen.

## 2022-12-02 ENCOUNTER — OFFICE VISIT (OUTPATIENT)
Dept: FAMILY MEDICINE CLINIC | Age: 77
End: 2022-12-02
Payer: MEDICARE

## 2022-12-02 VITALS
DIASTOLIC BLOOD PRESSURE: 78 MMHG | WEIGHT: 174 LBS | OXYGEN SATURATION: 98 % | SYSTOLIC BLOOD PRESSURE: 170 MMHG | HEIGHT: 62 IN | BODY MASS INDEX: 32.02 KG/M2 | HEART RATE: 85 BPM | RESPIRATION RATE: 17 BRPM | TEMPERATURE: 97.6 F

## 2022-12-02 DIAGNOSIS — R68.89 FLU-LIKE SYMPTOMS: Primary | ICD-10-CM

## 2022-12-02 DIAGNOSIS — J01.00 ACUTE MAXILLARY SINUSITIS, RECURRENCE NOT SPECIFIED: ICD-10-CM

## 2022-12-02 LAB
INFLUENZA A ANTIGEN, POC: NORMAL
INFLUENZA B ANTIGEN, POC: NORMAL
Lab: NORMAL
PERFORMING INSTRUMENT: NORMAL
QC PASS/FAIL: NORMAL
SARS-COV-2, POC: NORMAL

## 2022-12-02 PROCEDURE — 99213 OFFICE O/P EST LOW 20 MIN: CPT

## 2022-12-02 PROCEDURE — 87426 SARSCOV CORONAVIRUS AG IA: CPT

## 2022-12-02 PROCEDURE — 3074F SYST BP LT 130 MM HG: CPT

## 2022-12-02 PROCEDURE — 87804 INFLUENZA ASSAY W/OPTIC: CPT

## 2022-12-02 PROCEDURE — 1123F ACP DISCUSS/DSCN MKR DOCD: CPT

## 2022-12-02 PROCEDURE — 3078F DIAST BP <80 MM HG: CPT

## 2022-12-02 RX ORDER — AMOXICILLIN 500 MG/1
1 TABLET, FILM COATED ORAL 2 TIMES DAILY
Qty: 20 TABLET | Refills: 0 | Status: SHIPPED | OUTPATIENT
Start: 2022-12-02 | End: 2022-12-12

## 2022-12-02 RX ORDER — METHYLPREDNISOLONE 4 MG/1
TABLET ORAL
Qty: 1 KIT | Refills: 0 | Status: SHIPPED | OUTPATIENT
Start: 2022-12-02

## 2022-12-02 NOTE — PROGRESS NOTES
Chief Complaint       Cough (Sinus congestion and drainage, facial pain, chest congestion , ears hurt and sore throat started three days ago)      History of Present Illness   Source of history provided by:  patient. Joni Ernandez is a 65 y.o. old female presenting to the walk in clinic for evaluation of sinus pressure, nasal congestion, discolored nasal drainage, bilateral ear pressure, mild non-productive cough and sore throat x 6 days. Has been taking Delsym OTC without relief. Denies any fever, chills, wheezing, CP, SOB, or GI symptoms. Denies any hx of asthma, COPD, or tobacco use. Denies any contact with any individuals with known COVID-19 infection or under investigation for COVID-19 infection. Pt has been vaccinated for COVID-19. ROS    Unless otherwise stated in this report or unable to obtain because of the patient's clinical or mental status as evidenced by the medical record, this patients's positive and negative responses for Review of Systems, constitutional, psych, eyes, ENT, cardiovascular, respiratory, gastrointestinal, neurological, genitourinary, musculoskeletal, integument systems and systems related to the presenting problem are either stated in the preceding or were not pertinent or were negative for the symptoms and/or complaints related to the medical problem. Physical Exam         VS:  BP (!) 170/78   Pulse 85   Temp 97.6 °F (36.4 °C) (Temporal)   Resp 17   Ht 5' 2\" (1.575 m)   Wt 174 lb (78.9 kg)   SpO2 98%   BMI 31.83 kg/m²    Oxygen Saturation Interpretation: Normal.    Constitutional:  Alert, development consistent with age. Head: Mild TTP over the frontal and maxillary sinuses. Ears:  External Ears: Bilateral pinna normal. TMs intact without erythema or perforation bilaterally. Canals normal bilaterally without swelling or exudate  Nose:  mild congestion of the nasal mucosa. There is no injection to middle turbinates bilaterally.    Throat: no posterior pharyngeal erythema with mild post nasal drip present. No exudate or tonsillar hypertrophy noted. Neck:  Supple. There is no anterior cervical adenopathy. Lungs: CTAB without wheezes, rales, or rhonchi  Heart:  Regular rate and rhythm, normal heart sounds, without pathological murmurs, ectopy, gallops, or rubs. Skin:  Normal turgor. Warm, dry, without visible rash. Neurological:  Alert and oriented. Motor functions intact. Responds to verbal commands. Lab / Imaging Results   (All laboratory and radiology results have been personally reviewed by myself)  Labs:  Results for orders placed or performed in visit on 12/02/22   POCT COVID-19, Antigen   Result Value Ref Range    SARS-COV-2, POC Not-Detected Not Detected    Lot Number [id number]     QC Pass/Fail pass     Performing Instrument BD Veritor    POCT Influenza A/B Antigen (BD Veritor)   Result Value Ref Range    Inflenza A Ag neg     Influenza B Ag neg        Imaging: All Radiology results interpreted by Radiologist unless otherwise noted. Assessment / Plan     Impression(s):  Negrita Way was seen today for cough. Diagnoses and all orders for this visit:    Flu-like symptoms  -     POCT COVID-19, Antigen  -     POCT Influenza A/B Antigen (BD Veritor)    Acute maxillary sinusitis, recurrence not specified    Other orders  -     Amoxicillin 500 MG TABS; Take 1 tablet by mouth 2 times daily for 10 days  -     methylPREDNISolone (MEDROL DOSEPACK) 4 MG tablet; Take by mouth. Disposition:  Disposition: Discharge to home. Script written for Amoxicillin and medrol dose pack, side effects discussed. Increase fluids and rest. Symptomatic relief discussed. F/u PCP in 5-7 days if symptoms persist. ED sooner if symptoms worsen or change. Red flag symptoms discussed. Pt is in agreement with this care plan. All questions answered. Hillary Hamman, APRN - CNP    **This report was transcribed using voice recognition software.  Every effort was made to ensure accuracy; however, inadvertent computerized transcription errors may be present.

## 2023-01-16 DIAGNOSIS — F41.0 PANIC ANXIETY SYNDROME: ICD-10-CM

## 2023-01-16 RX ORDER — LORAZEPAM 1 MG/1
1 TABLET ORAL EVERY 8 HOURS PRN
Qty: 120 TABLET | Refills: 3 | Status: SHIPPED | OUTPATIENT
Start: 2023-01-16 | End: 2024-01-16

## 2023-03-19 RX ORDER — AMOXICILLIN AND CLAVULANATE POTASSIUM 875; 125 MG/1; MG/1
1 TABLET, FILM COATED ORAL 2 TIMES DAILY
Qty: 20 TABLET | Refills: 0 | Status: SHIPPED | OUTPATIENT
Start: 2023-03-19 | End: 2023-03-29

## 2023-04-21 DIAGNOSIS — I10 PRIMARY HYPERTENSION: ICD-10-CM

## 2023-04-21 RX ORDER — HYDROCHLOROTHIAZIDE 12.5 MG/1
12.5 TABLET ORAL DAILY
Qty: 90 TABLET | Refills: 5 | Status: SHIPPED | OUTPATIENT
Start: 2023-04-21

## 2023-05-04 DIAGNOSIS — E61.1 IRON DEFICIENCY: ICD-10-CM

## 2023-05-04 DIAGNOSIS — R73.01 IFG (IMPAIRED FASTING GLUCOSE): ICD-10-CM

## 2023-05-04 DIAGNOSIS — E78.00 PURE HYPERCHOLESTEROLEMIA: ICD-10-CM

## 2023-05-04 DIAGNOSIS — E53.8 VITAMIN B 12 DEFICIENCY: ICD-10-CM

## 2023-05-04 DIAGNOSIS — R53.82 CHRONIC FATIGUE: ICD-10-CM

## 2023-05-04 LAB
ALBUMIN SERPL-MCNC: 4.5 G/DL (ref 3.5–5.2)
ALP SERPL-CCNC: 72 U/L (ref 35–104)
ALT SERPL-CCNC: 22 U/L (ref 0–32)
ANION GAP SERPL CALCULATED.3IONS-SCNC: 19 MMOL/L (ref 7–16)
AST SERPL-CCNC: 27 U/L (ref 0–31)
BASOPHILS # BLD: 0.03 E9/L (ref 0–0.2)
BASOPHILS NFR BLD: 0.4 % (ref 0–2)
BILIRUB SERPL-MCNC: 0.9 MG/DL (ref 0–1.2)
BUN SERPL-MCNC: 11 MG/DL (ref 6–23)
CALCIUM SERPL-MCNC: 9.9 MG/DL (ref 8.6–10.2)
CHLORIDE SERPL-SCNC: 96 MMOL/L (ref 98–107)
CHOLESTEROL, TOTAL: 174 MG/DL (ref 0–199)
CO2 SERPL-SCNC: 22 MMOL/L (ref 22–29)
CREAT SERPL-MCNC: 0.7 MG/DL (ref 0.5–1)
EOSINOPHIL # BLD: 0.11 E9/L (ref 0.05–0.5)
EOSINOPHIL NFR BLD: 1.6 % (ref 0–6)
ERYTHROCYTE [DISTWIDTH] IN BLOOD BY AUTOMATED COUNT: 13.1 FL (ref 11.5–15)
FOLATE SERPL-MCNC: >20 NG/ML (ref 4.8–24.2)
GLUCOSE SERPL-MCNC: 91 MG/DL (ref 74–99)
HBA1C MFR BLD: 5.3 % (ref 4–5.6)
HCT VFR BLD AUTO: 42.8 % (ref 34–48)
HDLC SERPL-MCNC: 51 MG/DL
HGB BLD-MCNC: 14 G/DL (ref 11.5–15.5)
IMM GRANULOCYTES # BLD: 0.02 E9/L
IMM GRANULOCYTES NFR BLD: 0.3 % (ref 0–5)
IRON SATN MFR SERPL: 32 % (ref 15–50)
IRON SERPL-MCNC: 101 MCG/DL (ref 37–145)
LDLC SERPL CALC-MCNC: 92 MG/DL (ref 0–99)
LYMPHOCYTES # BLD: 1.46 E9/L (ref 1.5–4)
LYMPHOCYTES NFR BLD: 20.8 % (ref 20–42)
MCH RBC QN AUTO: 32.2 PG (ref 26–35)
MCHC RBC AUTO-ENTMCNC: 32.7 % (ref 32–34.5)
MCV RBC AUTO: 98.4 FL (ref 80–99.9)
MONOCYTES # BLD: 0.59 E9/L (ref 0.1–0.95)
MONOCYTES NFR BLD: 8.4 % (ref 2–12)
NEUTROPHILS # BLD: 4.81 E9/L (ref 1.8–7.3)
NEUTS SEG NFR BLD: 68.5 % (ref 43–80)
PLATELET # BLD AUTO: 324 E9/L (ref 130–450)
PMV BLD AUTO: 9.4 FL (ref 7–12)
POTASSIUM SERPL-SCNC: 4.2 MMOL/L (ref 3.5–5)
PROT SERPL-MCNC: 7.9 G/DL (ref 6.4–8.3)
RBC # BLD AUTO: 4.35 E12/L (ref 3.5–5.5)
SODIUM SERPL-SCNC: 137 MMOL/L (ref 132–146)
T4 FREE SERPL-MCNC: 1.3 NG/DL (ref 0.93–1.7)
TIBC SERPL-MCNC: 312 MCG/DL (ref 250–450)
TRIGL SERPL-MCNC: 156 MG/DL (ref 0–149)
TSH SERPL-MCNC: 3.11 UIU/ML (ref 0.27–4.2)
VIT B12 SERPL-MCNC: 976 PG/ML (ref 211–946)
VLDLC SERPL CALC-MCNC: 31 MG/DL
WBC # BLD: 7 E9/L (ref 4.5–11.5)

## 2023-05-22 ENCOUNTER — OFFICE VISIT (OUTPATIENT)
Dept: FAMILY MEDICINE CLINIC | Age: 78
End: 2023-05-22
Payer: MEDICARE

## 2023-05-22 VITALS
OXYGEN SATURATION: 98 % | DIASTOLIC BLOOD PRESSURE: 80 MMHG | BODY MASS INDEX: 31.83 KG/M2 | RESPIRATION RATE: 18 BRPM | HEART RATE: 96 BPM | SYSTOLIC BLOOD PRESSURE: 126 MMHG | WEIGHT: 173 LBS | HEIGHT: 62 IN

## 2023-05-22 DIAGNOSIS — E53.8 VITAMIN B 12 DEFICIENCY: ICD-10-CM

## 2023-05-22 DIAGNOSIS — I48.0 PAROXYSMAL ATRIAL FIBRILLATION (HCC): ICD-10-CM

## 2023-05-22 DIAGNOSIS — E78.00 PURE HYPERCHOLESTEROLEMIA: ICD-10-CM

## 2023-05-22 DIAGNOSIS — E66.8 MODERATE OBESITY: ICD-10-CM

## 2023-05-22 DIAGNOSIS — F41.0 PANIC ANXIETY SYNDROME: ICD-10-CM

## 2023-05-22 DIAGNOSIS — I10 PRIMARY HYPERTENSION: ICD-10-CM

## 2023-05-22 DIAGNOSIS — R73.01 IFG (IMPAIRED FASTING GLUCOSE): ICD-10-CM

## 2023-05-22 DIAGNOSIS — R53.82 CHRONIC FATIGUE: ICD-10-CM

## 2023-05-22 DIAGNOSIS — S39.012D STRAIN OF LUMBAR REGION, SUBSEQUENT ENCOUNTER: Primary | ICD-10-CM

## 2023-05-22 PROCEDURE — G8427 DOCREV CUR MEDS BY ELIG CLIN: HCPCS | Performed by: FAMILY MEDICINE

## 2023-05-22 PROCEDURE — 1036F TOBACCO NON-USER: CPT | Performed by: FAMILY MEDICINE

## 2023-05-22 PROCEDURE — 1090F PRES/ABSN URINE INCON ASSESS: CPT | Performed by: FAMILY MEDICINE

## 2023-05-22 PROCEDURE — 99214 OFFICE O/P EST MOD 30 MIN: CPT | Performed by: FAMILY MEDICINE

## 2023-05-22 PROCEDURE — G8399 PT W/DXA RESULTS DOCUMENT: HCPCS | Performed by: FAMILY MEDICINE

## 2023-05-22 PROCEDURE — 1123F ACP DISCUSS/DSCN MKR DOCD: CPT | Performed by: FAMILY MEDICINE

## 2023-05-22 PROCEDURE — 3074F SYST BP LT 130 MM HG: CPT | Performed by: FAMILY MEDICINE

## 2023-05-22 PROCEDURE — 3078F DIAST BP <80 MM HG: CPT | Performed by: FAMILY MEDICINE

## 2023-05-22 PROCEDURE — G8417 CALC BMI ABV UP PARAM F/U: HCPCS | Performed by: FAMILY MEDICINE

## 2023-05-22 RX ORDER — LORAZEPAM 1 MG/1
1 TABLET ORAL EVERY 8 HOURS PRN
Qty: 120 TABLET | Refills: 3 | Status: SHIPPED | OUTPATIENT
Start: 2023-05-22 | End: 2024-05-21

## 2023-05-22 RX ORDER — METOPROLOL SUCCINATE 50 MG/1
50 TABLET, EXTENDED RELEASE ORAL 2 TIMES DAILY
Qty: 180 TABLET | Refills: 3 | Status: SHIPPED | OUTPATIENT
Start: 2023-05-22

## 2023-05-22 RX ORDER — BACLOFEN 10 MG/1
10 TABLET ORAL 3 TIMES DAILY
Qty: 90 TABLET | Refills: 4 | Status: SHIPPED | OUTPATIENT
Start: 2023-05-22

## 2023-05-22 RX ORDER — LOSARTAN POTASSIUM 50 MG/1
TABLET ORAL
Qty: 90 TABLET | Refills: 5 | Status: SHIPPED | OUTPATIENT
Start: 2023-05-22

## 2023-05-22 SDOH — ECONOMIC STABILITY: HOUSING INSECURITY
IN THE LAST 12 MONTHS, WAS THERE A TIME WHEN YOU DID NOT HAVE A STEADY PLACE TO SLEEP OR SLEPT IN A SHELTER (INCLUDING NOW)?: PATIENT REFUSED

## 2023-05-22 SDOH — ECONOMIC STABILITY: FOOD INSECURITY: WITHIN THE PAST 12 MONTHS, THE FOOD YOU BOUGHT JUST DIDN'T LAST AND YOU DIDN'T HAVE MONEY TO GET MORE.: PATIENT DECLINED

## 2023-05-22 SDOH — ECONOMIC STABILITY: INCOME INSECURITY: HOW HARD IS IT FOR YOU TO PAY FOR THE VERY BASICS LIKE FOOD, HOUSING, MEDICAL CARE, AND HEATING?: PATIENT DECLINED

## 2023-05-22 SDOH — ECONOMIC STABILITY: FOOD INSECURITY: WITHIN THE PAST 12 MONTHS, YOU WORRIED THAT YOUR FOOD WOULD RUN OUT BEFORE YOU GOT MONEY TO BUY MORE.: PATIENT DECLINED

## 2023-05-22 ASSESSMENT — LIFESTYLE VARIABLES: HOW OFTEN DO YOU HAVE A DRINK CONTAINING ALCOHOL: NEVER

## 2023-05-22 ASSESSMENT — PATIENT HEALTH QUESTIONNAIRE - PHQ9
1. LITTLE INTEREST OR PLEASURE IN DOING THINGS: 0
SUM OF ALL RESPONSES TO PHQ QUESTIONS 1-9: 0
SUM OF ALL RESPONSES TO PHQ9 QUESTIONS 1 & 2: 0
SUM OF ALL RESPONSES TO PHQ QUESTIONS 1-9: 0
2. FEELING DOWN, DEPRESSED OR HOPELESS: 0

## 2023-05-23 ASSESSMENT — ENCOUNTER SYMPTOMS
COLOR CHANGE: 0
BACK PAIN: 0
EYE PAIN: 0
EYE REDNESS: 0
EYE DISCHARGE: 0
PHOTOPHOBIA: 0
CHEST TIGHTNESS: 0
EYE ITCHING: 0
ABDOMINAL PAIN: 0
RECTAL PAIN: 0
VOMITING: 0
FACIAL SWELLING: 0
TROUBLE SWALLOWING: 0
SHORTNESS OF BREATH: 0
NAUSEA: 0
VOICE CHANGE: 0
APNEA: 0
ABDOMINAL DISTENTION: 0
SORE THROAT: 0
RHINORRHEA: 0
CHOKING: 0
DIARRHEA: 0
STRIDOR: 0
SINUS PRESSURE: 0
CONSTIPATION: 0
BLOOD IN STOOL: 0
COUGH: 0
WHEEZING: 0
ANAL BLEEDING: 0

## 2023-11-21 DIAGNOSIS — I10 PRIMARY HYPERTENSION: ICD-10-CM

## 2023-11-21 DIAGNOSIS — F41.0 PANIC ANXIETY SYNDROME: ICD-10-CM

## 2023-11-21 RX ORDER — LORAZEPAM 1 MG/1
1 TABLET ORAL EVERY 8 HOURS PRN
Qty: 120 TABLET | Refills: 3 | OUTPATIENT
Start: 2023-11-21 | End: 2024-11-20

## 2023-11-21 RX ORDER — METOPROLOL SUCCINATE 50 MG/1
50 TABLET, EXTENDED RELEASE ORAL 2 TIMES DAILY
Qty: 180 TABLET | Refills: 3 | OUTPATIENT
Start: 2023-11-21

## 2023-11-22 ENCOUNTER — OFFICE VISIT (OUTPATIENT)
Dept: FAMILY MEDICINE CLINIC | Age: 78
End: 2023-11-22
Payer: MEDICARE

## 2023-11-22 VITALS
BODY MASS INDEX: 31.52 KG/M2 | HEART RATE: 109 BPM | OXYGEN SATURATION: 98 % | RESPIRATION RATE: 16 BRPM | TEMPERATURE: 98.4 F | DIASTOLIC BLOOD PRESSURE: 80 MMHG | WEIGHT: 171.3 LBS | SYSTOLIC BLOOD PRESSURE: 126 MMHG | HEIGHT: 62 IN

## 2023-11-22 DIAGNOSIS — I10 PRIMARY HYPERTENSION: ICD-10-CM

## 2023-11-22 DIAGNOSIS — F41.0 PANIC ANXIETY SYNDROME: ICD-10-CM

## 2023-11-22 PROCEDURE — 1036F TOBACCO NON-USER: CPT | Performed by: FAMILY MEDICINE

## 2023-11-22 PROCEDURE — G8484 FLU IMMUNIZE NO ADMIN: HCPCS | Performed by: FAMILY MEDICINE

## 2023-11-22 PROCEDURE — G8399 PT W/DXA RESULTS DOCUMENT: HCPCS | Performed by: FAMILY MEDICINE

## 2023-11-22 PROCEDURE — 1123F ACP DISCUSS/DSCN MKR DOCD: CPT | Performed by: FAMILY MEDICINE

## 2023-11-22 PROCEDURE — 1090F PRES/ABSN URINE INCON ASSESS: CPT | Performed by: FAMILY MEDICINE

## 2023-11-22 PROCEDURE — G8417 CALC BMI ABV UP PARAM F/U: HCPCS | Performed by: FAMILY MEDICINE

## 2023-11-22 PROCEDURE — G8427 DOCREV CUR MEDS BY ELIG CLIN: HCPCS | Performed by: FAMILY MEDICINE

## 2023-11-22 PROCEDURE — 3078F DIAST BP <80 MM HG: CPT | Performed by: FAMILY MEDICINE

## 2023-11-22 PROCEDURE — 99214 OFFICE O/P EST MOD 30 MIN: CPT | Performed by: FAMILY MEDICINE

## 2023-11-22 PROCEDURE — 3074F SYST BP LT 130 MM HG: CPT | Performed by: FAMILY MEDICINE

## 2023-11-22 RX ORDER — LORAZEPAM 1 MG/1
1 TABLET ORAL EVERY 8 HOURS PRN
Qty: 120 TABLET | Refills: 3 | Status: SHIPPED | OUTPATIENT
Start: 2023-11-22 | End: 2024-11-21

## 2023-11-22 RX ORDER — METOPROLOL SUCCINATE 50 MG/1
50 TABLET, EXTENDED RELEASE ORAL 2 TIMES DAILY
Qty: 180 TABLET | Refills: 3 | Status: SHIPPED | OUTPATIENT
Start: 2023-11-22

## 2023-11-22 RX ORDER — LORAZEPAM 1 MG/1
1 TABLET ORAL EVERY 8 HOURS PRN
Qty: 120 TABLET | Refills: 3 | Status: CANCELLED | OUTPATIENT
Start: 2023-11-22 | End: 2024-11-21

## 2023-11-22 NOTE — PROGRESS NOTES
tablet 5    baclofen (LIORESAL) 10 MG tablet Take 1 tablet by mouth 3 times daily 90 tablet 4    hydroCHLOROthiazide (HYDRODIURIL) 12.5 MG tablet TAKE 1 TABLET BY MOUTH DAILY 90 tablet 5    vitamin C (ASCORBIC ACID) 500 MG tablet Take 1 tablet by mouth daily      apixaban (ELIQUIS) 5 MG TABS tablet Take 1 tablet by mouth 2 times daily 60 tablet 5    acetaminophen (TYLENOL) 650 MG extended release tablet Tylenol Arthritis Pain 650 mg tablet,extended release   Take 1 tablet every 8 hours by oral route. RED YEAST RICE by Does not apply route. therapeutic multivitamin-minerals (THERAGRAN-M) tablet Take 1 tablet by mouth daily      Coenzyme Q10 (CO Q 10) 10 MG CAPS Take  by mouth. Vitamin D (CHOLECALCIFEROL) 1000 UNITS CAPS capsule Take 1 capsule by mouth daily      Probiotic Product (PROBIOTIC FORMULA PO) Take  by mouth.      loratadine (CLARITIN) 10 MG tablet Take 1 tablet by mouth daily      nystatin-triamcinolone (MYCOLOG II) 479832-8.1 UNIT/GM-% cream APPLY TO THE AFFECTED AREA TWICE DAILY AS NEEDED FOR ITCHING      LORazepam (ATIVAN) 1 MG tablet Take 1 tablet by mouth every 8 hours as needed for Anxiety. 120 tablet 3    ipratropium (ATROVENT) 0.06 % nasal spray 2 sprays by Each Nostril route 4 times daily (Patient not taking: Reported on 11/22/2023) 15 mL 4    diclofenac sodium (VOLTAREN) 1 % GEL Apply 4 g topically in the morning and 4 g before bedtime. (Patient not taking: Reported on 11/22/2023) 350 g 5    ketoconazole (NIZORAL) 2 % cream       mupirocin (BACTROBAN) 2 % cream Apply at bedtime to bilateral nostrils as directed. (Patient not taking: Reported on 11/22/2023) 30 g 5     No current facility-administered medications for this visit.         Ladi Andujar MD     Electronically signed by Ladi Andujar MD on 11/27/2023 at 8:45 AM

## 2024-02-11 DIAGNOSIS — I10 PRIMARY HYPERTENSION: ICD-10-CM

## 2024-02-12 RX ORDER — METOPROLOL SUCCINATE 50 MG/1
50 TABLET, EXTENDED RELEASE ORAL 2 TIMES DAILY
Qty: 180 TABLET | Refills: 3 | Status: SHIPPED | OUTPATIENT
Start: 2024-02-12

## 2024-02-28 DIAGNOSIS — M25.551 BILATERAL HIP PAIN: Primary | ICD-10-CM

## 2024-02-28 DIAGNOSIS — M54.59 DISCOGENIC LUMBAR PAIN: ICD-10-CM

## 2024-02-28 DIAGNOSIS — M25.562 CHRONIC PAIN OF BOTH KNEES: ICD-10-CM

## 2024-02-28 DIAGNOSIS — M25.552 BILATERAL HIP PAIN: Primary | ICD-10-CM

## 2024-02-28 DIAGNOSIS — M25.561 CHRONIC PAIN OF BOTH KNEES: ICD-10-CM

## 2024-02-28 DIAGNOSIS — M17.0 PRIMARY OSTEOARTHRITIS OF BOTH KNEES: ICD-10-CM

## 2024-02-28 DIAGNOSIS — F41.0 PANIC ANXIETY SYNDROME: ICD-10-CM

## 2024-02-28 DIAGNOSIS — G89.29 CHRONIC PAIN OF BOTH KNEES: ICD-10-CM

## 2024-02-28 RX ORDER — LORAZEPAM 1 MG/1
1 TABLET ORAL EVERY 8 HOURS PRN
Qty: 270 TABLET | Refills: 4 | Status: SHIPPED | OUTPATIENT
Start: 2024-02-28 | End: 2025-02-27

## 2024-03-07 DIAGNOSIS — M25.561 PAIN IN BOTH KNEES, UNSPECIFIED CHRONICITY: Primary | ICD-10-CM

## 2024-03-07 DIAGNOSIS — M25.562 PAIN IN BOTH KNEES, UNSPECIFIED CHRONICITY: Primary | ICD-10-CM

## 2024-05-22 ENCOUNTER — OFFICE VISIT (OUTPATIENT)
Dept: FAMILY MEDICINE CLINIC | Age: 79
End: 2024-05-22
Payer: MEDICARE

## 2024-05-22 VITALS — SYSTOLIC BLOOD PRESSURE: 127 MMHG | DIASTOLIC BLOOD PRESSURE: 80 MMHG | HEART RATE: 80 BPM

## 2024-05-22 DIAGNOSIS — I10 PRIMARY HYPERTENSION: ICD-10-CM

## 2024-05-22 DIAGNOSIS — R53.82 CHRONIC FATIGUE: ICD-10-CM

## 2024-05-22 DIAGNOSIS — I48.0 PAROXYSMAL ATRIAL FIBRILLATION (HCC): ICD-10-CM

## 2024-05-22 DIAGNOSIS — R73.01 IFG (IMPAIRED FASTING GLUCOSE): ICD-10-CM

## 2024-05-22 DIAGNOSIS — E55.9 VITAMIN D DEFICIENCY: ICD-10-CM

## 2024-05-22 DIAGNOSIS — Z00.00 INITIAL MEDICARE ANNUAL WELLNESS VISIT: Primary | ICD-10-CM

## 2024-05-22 DIAGNOSIS — E78.2 MIXED HYPERLIPIDEMIA: ICD-10-CM

## 2024-05-22 PROBLEM — R00.2 PALPITATIONS: Status: RESOLVED | Noted: 2022-02-18 | Resolved: 2024-05-22

## 2024-05-22 PROCEDURE — 3074F SYST BP LT 130 MM HG: CPT | Performed by: FAMILY MEDICINE

## 2024-05-22 PROCEDURE — G0438 PPPS, INITIAL VISIT: HCPCS | Performed by: FAMILY MEDICINE

## 2024-05-22 PROCEDURE — 1123F ACP DISCUSS/DSCN MKR DOCD: CPT | Performed by: FAMILY MEDICINE

## 2024-05-22 PROCEDURE — 3079F DIAST BP 80-89 MM HG: CPT | Performed by: FAMILY MEDICINE

## 2024-05-22 SDOH — ECONOMIC STABILITY: FOOD INSECURITY: WITHIN THE PAST 12 MONTHS, YOU WORRIED THAT YOUR FOOD WOULD RUN OUT BEFORE YOU GOT MONEY TO BUY MORE.: PATIENT DECLINED

## 2024-05-22 SDOH — ECONOMIC STABILITY: INCOME INSECURITY: HOW HARD IS IT FOR YOU TO PAY FOR THE VERY BASICS LIKE FOOD, HOUSING, MEDICAL CARE, AND HEATING?: PATIENT DECLINED

## 2024-05-22 SDOH — ECONOMIC STABILITY: HOUSING INSECURITY
IN THE LAST 12 MONTHS, WAS THERE A TIME WHEN YOU DID NOT HAVE A STEADY PLACE TO SLEEP OR SLEPT IN A SHELTER (INCLUDING NOW)?: PATIENT DECLINED

## 2024-05-22 SDOH — ECONOMIC STABILITY: FOOD INSECURITY: WITHIN THE PAST 12 MONTHS, THE FOOD YOU BOUGHT JUST DIDN'T LAST AND YOU DIDN'T HAVE MONEY TO GET MORE.: PATIENT DECLINED

## 2024-05-22 ASSESSMENT — PATIENT HEALTH QUESTIONNAIRE - PHQ9
SUM OF ALL RESPONSES TO PHQ QUESTIONS 1-9: 0
SUM OF ALL RESPONSES TO PHQ QUESTIONS 1-9: 0
1. LITTLE INTEREST OR PLEASURE IN DOING THINGS: NOT AT ALL
SUM OF ALL RESPONSES TO PHQ9 QUESTIONS 1 & 2: 0
SUM OF ALL RESPONSES TO PHQ QUESTIONS 1-9: 0
2. FEELING DOWN, DEPRESSED OR HOPELESS: NOT AT ALL
SUM OF ALL RESPONSES TO PHQ QUESTIONS 1-9: 0

## 2024-05-22 ASSESSMENT — LIFESTYLE VARIABLES: HOW OFTEN DO YOU HAVE A DRINK CONTAINING ALCOHOL: NEVER

## 2024-05-22 NOTE — PROGRESS NOTES
Brittany Snow is a 79 y.o. female  .  Subjective:      Discussed vaccinations recommendations        Review of Systems    Past Medical History:   Diagnosis Date    Anxiety     Arthritis     Hypertension     Pure hypercholesterolemia 2/18/2022       Social History     Socioeconomic History    Marital status:      Spouse name: Not on file    Number of children: Not on file    Years of education: Not on file    Highest education level: Not on file   Occupational History    Not on file   Tobacco Use    Smoking status: Never    Smokeless tobacco: Never   Vaping Use    Vaping Use: Never used   Substance and Sexual Activity    Alcohol use: No    Drug use: No    Sexual activity: Not Currently     Partners: Male   Other Topics Concern    Not on file   Social History Narrative    Not on file     Social Determinants of Health     Financial Resource Strain: Patient Declined (5/22/2024)    Overall Financial Resource Strain (CARDIA)     Difficulty of Paying Living Expenses: Patient declined   Food Insecurity: Patient Declined (5/22/2024)    Hunger Vital Sign     Worried About Running Out of Food in the Last Year: Patient declined     Ran Out of Food in the Last Year: Patient declined   Transportation Needs: Unknown (5/22/2024)    PRAPARE - Transportation     Lack of Transportation (Medical): Not on file     Lack of Transportation (Non-Medical): Patient declined   Physical Activity: Unknown (3/9/2024)    Exercise Vital Sign     Days of Exercise per Week: 0 days     Minutes of Exercise per Session: Not on file   Stress: Not on file   Social Connections: Not on file   Intimate Partner Violence: Not on file   Housing Stability: Unknown (5/22/2024)    Housing Stability Vital Sign     Unable to Pay for Housing in the Last Year: Not on file     Number of Places Lived in the Last Year: Not on file     Unstable Housing in the Last Year: Patient declined       No family history on file.    Current Outpatient Medications on File Prior

## 2024-05-29 NOTE — PATIENT INSTRUCTIONS
help you develop a safe and effective exercise program.  A counselor or psychiatrist can help you cope with issues such as depression, anxiety, or family problems that can make it hard to focus on weight loss.  Consider joining a support group for people who are trying to lose weight. Your doctor can suggest groups in your area.  Where can you learn more?  Go to https://www.[employer].net/patientEd and enter U357 to learn more about \"Starting a Weight Loss Plan: Care Instructions.\"  Current as of: September 20, 2023               Content Version: 14.0  © 7161-5882 [employer].   Care instructions adapted under license by [employer]. If you have questions about a medical condition or this instruction, always ask your healthcare professional. [employer] disclaims any warranty or liability for your use of this information.           A Healthy Heart: Care Instructions  Overview     Coronary artery disease, also called heart disease, occurs when a substance called plaque builds up in the vessels that supply oxygen-rich blood to your heart muscle. This can narrow the blood vessels and reduce blood flow. A heart attack happens when blood flow is completely blocked. A high-fat diet, smoking, and other factors increase the risk of heart disease.  Your doctor has found that you have a chance of having heart disease. A heart-healthy lifestyle can help keep your heart healthy and prevent heart disease. This lifestyle includes eating healthy, being active, staying at a weight that's healthy for you, and not smoking or using tobacco. It also includes taking medicines as directed, managing other health conditions, and trying to get a healthy amount of sleep.  Follow-up care is a key part of your treatment and safety. Be sure to make and go to all appointments, and call your doctor if you are having problems. It's also a good idea to know your test results and keep a list of the medicines you take.  How

## 2024-06-05 DIAGNOSIS — R53.82 CHRONIC FATIGUE: ICD-10-CM

## 2024-06-05 DIAGNOSIS — E78.2 MIXED HYPERLIPIDEMIA: ICD-10-CM

## 2024-06-05 DIAGNOSIS — E55.9 VITAMIN D DEFICIENCY: ICD-10-CM

## 2024-06-05 DIAGNOSIS — R73.01 IFG (IMPAIRED FASTING GLUCOSE): ICD-10-CM

## 2024-06-05 LAB
ALBUMIN: 4.4 G/DL (ref 3.5–5.2)
ALP BLD-CCNC: 71 U/L (ref 35–104)
ALT SERPL-CCNC: 18 U/L (ref 0–32)
ANION GAP SERPL CALCULATED.3IONS-SCNC: 17 MMOL/L (ref 7–16)
AST SERPL-CCNC: 23 U/L (ref 0–31)
BASOPHILS ABSOLUTE: 0.04 K/UL (ref 0–0.2)
BASOPHILS RELATIVE PERCENT: 1 % (ref 0–2)
BILIRUB SERPL-MCNC: 1.1 MG/DL (ref 0–1.2)
BUN BLDV-MCNC: 20 MG/DL (ref 6–23)
CALCIUM SERPL-MCNC: 9.5 MG/DL (ref 8.6–10.2)
CHLORIDE BLD-SCNC: 97 MMOL/L (ref 98–107)
CHOLESTEROL, TOTAL: 191 MG/DL
CO2: 20 MMOL/L (ref 22–29)
CREAT SERPL-MCNC: 0.7 MG/DL (ref 0.5–1)
EOSINOPHILS ABSOLUTE: 0.1 K/UL (ref 0.05–0.5)
EOSINOPHILS RELATIVE PERCENT: 2 % (ref 0–6)
GFR, ESTIMATED: 86 ML/MIN/1.73M2
GLUCOSE BLD-MCNC: 96 MG/DL (ref 74–99)
HBA1C MFR BLD: 5.8 % (ref 4–5.6)
HCT VFR BLD CALC: 43.3 % (ref 34–48)
HDLC SERPL-MCNC: 50 MG/DL
HEMOGLOBIN: 14.6 G/DL (ref 11.5–15.5)
IMMATURE GRANULOCYTES %: 1 % (ref 0–5)
IMMATURE GRANULOCYTES ABSOLUTE: 0.03 K/UL (ref 0–0.58)
LDL CHOLESTEROL: 112 MG/DL
LYMPHOCYTES ABSOLUTE: 1.73 K/UL (ref 1.5–4)
LYMPHOCYTES RELATIVE PERCENT: 26 % (ref 20–42)
MCH RBC QN AUTO: 33 PG (ref 26–35)
MCHC RBC AUTO-ENTMCNC: 33.7 G/DL (ref 32–34.5)
MCV RBC AUTO: 98 FL (ref 80–99.9)
MONOCYTES ABSOLUTE: 0.83 K/UL (ref 0.1–0.95)
MONOCYTES RELATIVE PERCENT: 13 % (ref 2–12)
NEUTROPHILS ABSOLUTE: 3.9 K/UL (ref 1.8–7.3)
NEUTROPHILS RELATIVE PERCENT: 59 % (ref 43–80)
PDW BLD-RTO: 13.2 % (ref 11.5–15)
PLATELET # BLD: 293 K/UL (ref 130–450)
PMV BLD AUTO: 9.7 FL (ref 7–12)
POTASSIUM SERPL-SCNC: 4.3 MMOL/L (ref 3.5–5)
RBC # BLD: 4.42 M/UL (ref 3.5–5.5)
SODIUM BLD-SCNC: 134 MMOL/L (ref 132–146)
T4 FREE: 1.3 NG/DL (ref 0.9–1.7)
TOTAL PROTEIN: 7.6 G/DL (ref 6.4–8.3)
TRIGL SERPL-MCNC: 147 MG/DL
TSH SERPL DL<=0.05 MIU/L-ACNC: 4.51 UIU/ML (ref 0.27–4.2)
VITAMIN D 25-HYDROXY: 44.1 NG/ML (ref 30–100)
VLDLC SERPL CALC-MCNC: 29 MG/DL
WBC # BLD: 6.6 K/UL (ref 4.5–11.5)

## 2024-07-17 ENCOUNTER — PATIENT MESSAGE (OUTPATIENT)
Dept: FAMILY MEDICINE CLINIC | Age: 79
End: 2024-07-17

## 2024-07-17 RX ORDER — AMOXICILLIN 500 MG/1
500 CAPSULE ORAL 2 TIMES DAILY
Qty: 20 CAPSULE | Refills: 0 | Status: SHIPPED | OUTPATIENT
Start: 2024-07-17 | End: 2024-07-27

## 2024-07-17 NOTE — TELEPHONE ENCOUNTER
From: Brittany Snow  To: Dr. Blaine Lopez  Sent: 7/17/2024 11:14 AM EDT  Subject: Amoxocillon    I have shingles but I have a sinus infection with lightheaded, dizziness I would like a prescription for Amixicillian. That is the only thing that works for me. Thank you Brittany Snow

## 2024-08-01 DIAGNOSIS — I10 PRIMARY HYPERTENSION: ICD-10-CM

## 2024-08-01 RX ORDER — LOSARTAN POTASSIUM 50 MG/1
TABLET ORAL
Qty: 90 TABLET | Refills: 5 | Status: SHIPPED | OUTPATIENT
Start: 2024-08-01

## 2024-08-13 DIAGNOSIS — I10 PRIMARY HYPERTENSION: ICD-10-CM

## 2024-08-13 RX ORDER — METOPROLOL SUCCINATE 50 MG/1
50 TABLET, EXTENDED RELEASE ORAL 2 TIMES DAILY
Qty: 180 TABLET | Refills: 3 | Status: SHIPPED | OUTPATIENT
Start: 2024-08-13

## 2024-08-13 RX ORDER — HYDROCHLOROTHIAZIDE 12.5 MG/1
12.5 TABLET ORAL DAILY
Qty: 90 TABLET | Refills: 5 | Status: SHIPPED | OUTPATIENT
Start: 2024-08-13

## 2024-10-29 DIAGNOSIS — F41.0 PANIC ANXIETY SYNDROME: ICD-10-CM

## 2024-10-30 RX ORDER — LORAZEPAM 1 MG/1
1 TABLET ORAL EVERY 8 HOURS PRN
Qty: 270 TABLET | Refills: 4 | Status: SHIPPED | OUTPATIENT
Start: 2024-10-30 | End: 2025-10-30

## 2024-11-02 DIAGNOSIS — F41.0 PANIC ANXIETY SYNDROME: ICD-10-CM

## 2024-11-04 RX ORDER — LORAZEPAM 1 MG/1
1 TABLET ORAL EVERY 8 HOURS PRN
Qty: 270 TABLET | Refills: 4 | Status: SHIPPED | OUTPATIENT
Start: 2024-11-04 | End: 2025-11-04

## 2024-11-25 ENCOUNTER — TELEMEDICINE (OUTPATIENT)
Dept: FAMILY MEDICINE CLINIC | Age: 79
End: 2024-11-25

## 2024-11-25 DIAGNOSIS — J01.00 ACUTE NON-RECURRENT MAXILLARY SINUSITIS: Primary | ICD-10-CM

## 2024-11-25 RX ORDER — IPRATROPIUM BROMIDE 42 UG/1
2 SPRAY, METERED NASAL 4 TIMES DAILY
Qty: 15 ML | Refills: 3 | Status: SHIPPED | OUTPATIENT
Start: 2024-11-25

## 2024-11-25 RX ORDER — GUAIFENESIN 600 MG/1
600 TABLET, EXTENDED RELEASE ORAL 2 TIMES DAILY
Qty: 30 TABLET | Refills: 0 | Status: SHIPPED | OUTPATIENT
Start: 2024-11-25 | End: 2024-12-10

## 2024-11-25 RX ORDER — AMOXICILLIN 500 MG/1
500 CAPSULE ORAL 2 TIMES DAILY
Qty: 20 CAPSULE | Refills: 0 | Status: SHIPPED | OUTPATIENT
Start: 2024-11-25 | End: 2024-12-05

## 2024-11-25 ASSESSMENT — ENCOUNTER SYMPTOMS
WHEEZING: 0
RHINORRHEA: 1
SINUS PRESSURE: 1
COUGH: 1
SINUS PAIN: 1

## 2024-11-25 ASSESSMENT — PATIENT HEALTH QUESTIONNAIRE - PHQ9
2. FEELING DOWN, DEPRESSED OR HOPELESS: NOT AT ALL
SUM OF ALL RESPONSES TO PHQ QUESTIONS 1-9: 0
SUM OF ALL RESPONSES TO PHQ9 QUESTIONS 1 & 2: 0
1. LITTLE INTEREST OR PLEASURE IN DOING THINGS: NOT AT ALL
SUM OF ALL RESPONSES TO PHQ QUESTIONS 1-9: 0

## 2024-11-25 NOTE — PROGRESS NOTES
care in the state where the patient is located as indicated above. If you are not or unsure, please re-schedule the visit: Yes, I confirm.      Total time spent for this encounter: Not billed by time    --Blaine Lopez,  on 11/25/2024 at 9:12 PM    An electronic signature was used to authenticate this note.     Willfollow with own gynecologist for gynecological and breast care.    Reviewed health maintenance report. Patient is aware of deficiencies and suggested preventative tests.

## 2025-05-05 DIAGNOSIS — F41.0 PANIC ANXIETY SYNDROME: ICD-10-CM

## 2025-05-05 RX ORDER — LORAZEPAM 1 MG/1
1 TABLET ORAL EVERY 8 HOURS PRN
Qty: 270 TABLET | OUTPATIENT
Start: 2025-05-05 | End: 2026-05-05

## 2025-05-08 RX ORDER — LORAZEPAM 1 MG/1
1 TABLET ORAL EVERY 8 HOURS PRN
Qty: 270 TABLET | Refills: 2 | Status: SHIPPED | OUTPATIENT
Start: 2025-05-08 | End: 2026-05-08

## 2025-06-11 RX ORDER — APIXABAN 5 MG/1
5 TABLET, FILM COATED ORAL 2 TIMES DAILY
Qty: 180 TABLET | Refills: 5 | Status: SHIPPED | OUTPATIENT
Start: 2025-06-11

## 2025-07-17 DIAGNOSIS — I10 PRIMARY HYPERTENSION: ICD-10-CM

## 2025-07-17 DIAGNOSIS — F41.0 PANIC ANXIETY SYNDROME: ICD-10-CM

## 2025-07-17 RX ORDER — LORAZEPAM 1 MG/1
1 TABLET ORAL EVERY 8 HOURS PRN
Qty: 270 TABLET | Refills: 2 | Status: SHIPPED | OUTPATIENT
Start: 2025-07-17 | End: 2026-07-17

## 2025-07-17 RX ORDER — METOPROLOL SUCCINATE 50 MG/1
50 TABLET, EXTENDED RELEASE ORAL 2 TIMES DAILY
Qty: 180 TABLET | Refills: 3 | Status: CANCELLED | OUTPATIENT
Start: 2025-07-17

## 2025-07-17 RX ORDER — METOPROLOL SUCCINATE 50 MG/1
50 TABLET, EXTENDED RELEASE ORAL 2 TIMES DAILY
Qty: 180 TABLET | Refills: 3 | Status: SHIPPED | OUTPATIENT
Start: 2025-07-17

## 2025-07-17 NOTE — TELEPHONE ENCOUNTER
Name of Medication(s) Requested:  Requested Prescriptions     Pending Prescriptions Disp Refills    metoprolol succinate (TOPROL XL) 50 MG extended release tablet [Pharmacy Med Name: METOPROLOL ER SUCCINATE 50MG TABS] 180 tablet 3     Sig: TAKE 1 TABLET BY MOUTH TWICE DAILY       Medication is on current medication list Yes    Dosage and directions were verified? Yes    Quantity verified: 90 day supply     Pharmacy Verified?  Yes    Last Appointment:  11/25/2024    Future appts:  No future appointments.     (If no appt send self scheduling link. .REFILLAPPT)  Scheduling request sent?     [x] Yes  [] No    Does patient need updated?  [] Yes  [] No

## 2025-08-01 DIAGNOSIS — I10 PRIMARY HYPERTENSION: ICD-10-CM

## 2025-08-01 DIAGNOSIS — F41.0 PANIC ANXIETY SYNDROME: ICD-10-CM

## 2025-08-01 RX ORDER — LORAZEPAM 1 MG/1
1 TABLET ORAL EVERY 8 HOURS PRN
Qty: 270 TABLET | Refills: 2 | Status: CANCELLED | OUTPATIENT
Start: 2025-08-01 | End: 2026-08-01

## 2025-08-01 RX ORDER — METOPROLOL SUCCINATE 50 MG/1
50 TABLET, EXTENDED RELEASE ORAL 2 TIMES DAILY
Qty: 180 TABLET | Refills: 3 | Status: CANCELLED | OUTPATIENT
Start: 2025-08-01

## 2025-08-07 RX ORDER — METOPROLOL SUCCINATE 50 MG/1
50 TABLET, EXTENDED RELEASE ORAL 2 TIMES DAILY
Qty: 180 TABLET | Refills: 3 | Status: SHIPPED | OUTPATIENT
Start: 2025-08-07